# Patient Record
Sex: MALE | Race: WHITE | NOT HISPANIC OR LATINO | Employment: UNEMPLOYED | URBAN - METROPOLITAN AREA
[De-identification: names, ages, dates, MRNs, and addresses within clinical notes are randomized per-mention and may not be internally consistent; named-entity substitution may affect disease eponyms.]

---

## 2017-02-14 ENCOUNTER — OFFICE VISIT (OUTPATIENT)
Dept: INTERNAL MEDICINE | Facility: CLINIC | Age: 37
End: 2017-02-14

## 2017-02-14 VITALS
TEMPERATURE: 98.3 F | SYSTOLIC BLOOD PRESSURE: 142 MMHG | HEIGHT: 72 IN | DIASTOLIC BLOOD PRESSURE: 86 MMHG | HEART RATE: 82 BPM | WEIGHT: 306.25 LBS | OXYGEN SATURATION: 98 % | BODY MASS INDEX: 41.48 KG/M2

## 2017-02-14 DIAGNOSIS — J40 BRONCHITIS: ICD-10-CM

## 2017-02-14 DIAGNOSIS — J06.9 ACUTE URI: Primary | ICD-10-CM

## 2017-02-14 PROCEDURE — 99213 OFFICE O/P EST LOW 20 MIN: CPT | Performed by: NURSE PRACTITIONER

## 2017-02-14 RX ORDER — AZITHROMYCIN 250 MG/1
TABLET, FILM COATED ORAL
Qty: 6 TABLET | Refills: 0 | Status: SHIPPED | OUTPATIENT
Start: 2017-02-14 | End: 2022-02-24

## 2017-02-14 NOTE — PROGRESS NOTES
Subjective   Best Abdullahi is a 37 y.o. male. Patient is here today for   Chief Complaint   Patient presents with   • URI     Pt complains of having productive cough, congestion, ST, chest pain x2 days. Pt has tried taking Dayquil Otc.    .    History of Present Illness   C/O nasal congestion x 2 days associated with sore throat, chest congestion, cough. He has tried Dayquil and an antihistamine which did help some. Denies fever, shortness of breath.     The following portions of the patient's history were reviewed and updated as appropriate: allergies, current medications, past family history, past medical history, past social history, past surgical history and problem list.    Review of Systems   Constitutional: Positive for chills.   HENT: Positive for congestion, postnasal drip and sore throat. Negative for ear pain and sinus pressure.    Respiratory: Positive for cough. Negative for shortness of breath and wheezing.    Cardiovascular: Negative.        Objective   Vitals:    02/14/17 1055   BP: 142/86   Pulse: 82   Temp: 98.3 °F (36.8 °C)   SpO2: 98%     Physical Exam   Constitutional: Vital signs are normal. He appears well-developed and well-nourished.   HENT:   Right Ear: A middle ear effusion is present.   Left Ear: A middle ear effusion is present.   Mouth/Throat: Posterior oropharyngeal erythema present. No tonsillar abscesses.   Cardiovascular: Normal rate, regular rhythm and normal heart sounds.    Pulmonary/Chest: Effort normal and breath sounds normal.   Lymphadenopathy:     He has no cervical adenopathy.   Skin: Skin is warm and dry.   Nursing note and vitals reviewed.      Assessment/Plan   Best was seen today for uri.    Diagnoses and all orders for this visit:    Acute URI    Bronchitis  -     azithromycin (ZITHROMAX) 250 MG tablet; Take 2 tablets the first day, then 1 tablet daily for 4 days.      Patient will use OTC Mucinex DM,  Increase fluids, and rest.

## 2021-07-09 ENCOUNTER — TELEPHONE (OUTPATIENT)
Dept: INTERNAL MEDICINE | Facility: CLINIC | Age: 41
End: 2021-07-09

## 2021-07-09 NOTE — TELEPHONE ENCOUNTER
Caller: Best Abdullahi    Relationship: Self    Best call back number: [phone]     What medication are you requesting: TRIAMCINOLONE ACETONIEE    What are your current symptoms: RASH IN ARM PIT      Have you had these symptoms before:    [x] Yes  [] No    Have you been treated for these symptoms before:   [x] Yes  [] No    If a prescription is needed, what is your preferred pharmacy and phone number:    [facility]  [facility] [id number] [phone]    Additional notes:

## 2021-07-09 NOTE — TELEPHONE ENCOUNTER
Spoke with patients wife Di-she is aware that he has not been seen since 2017 & would be considered a new patient. The patient is actually out of town in [place]. He has been made aware to go to an immediate care to be treated for his rash.

## 2022-02-24 ENCOUNTER — OFFICE VISIT (OUTPATIENT)
Dept: INTERNAL MEDICINE | Facility: CLINIC | Age: 42
End: 2022-02-24

## 2022-02-24 VITALS
SYSTOLIC BLOOD PRESSURE: 122 MMHG | TEMPERATURE: 97 F | BODY MASS INDEX: 39.42 KG/M2 | HEIGHT: 72 IN | HEART RATE: 99 BPM | DIASTOLIC BLOOD PRESSURE: 62 MMHG | OXYGEN SATURATION: 97 % | WEIGHT: 291 LBS

## 2022-02-24 DIAGNOSIS — E66.9 OBESITY (BMI 30-39.9): ICD-10-CM

## 2022-02-24 DIAGNOSIS — R42 LIGHTHEADEDNESS: ICD-10-CM

## 2022-02-24 DIAGNOSIS — M62.830 LUMBAR PARASPINAL MUSCLE SPASM: ICD-10-CM

## 2022-02-24 DIAGNOSIS — Z00.00 HEALTH CARE MAINTENANCE: Primary | ICD-10-CM

## 2022-02-24 DIAGNOSIS — R42 DIZZINESS: ICD-10-CM

## 2022-02-24 PROCEDURE — 99386 PREV VISIT NEW AGE 40-64: CPT | Performed by: NURSE PRACTITIONER
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
## 2022-03-08 LAB
25(OH)D3+25(OH)D2 SERPL-MCNC: 10.3 NG/ML (ref 30–100)
ALBUMIN SERPL-MCNC: 4 G/DL (ref 4–5)
ALBUMIN/GLOB SERPL: 1.6 {RATIO} (ref 1.2–2.2)
ALP SERPL-CCNC: 51 IU/L (ref 44–121)
ALT SERPL-CCNC: 31 IU/L (ref 0–44)
AST SERPL-CCNC: 24 IU/L (ref 0–40)
BASOPHILS # BLD AUTO: 0.1 X10E3/UL (ref 0–0.2)
BASOPHILS NFR BLD AUTO: 1 %
BILIRUB SERPL-MCNC: 0.4 MG/DL (ref 0–1.2)
BUN SERPL-MCNC: 7 MG/DL (ref 6–24)
BUN/CREAT SERPL: 8 (ref 9–20)
CALCIUM SERPL-MCNC: 8.9 MG/DL (ref 8.7–10.2)
CHLORIDE SERPL-SCNC: 103 MMOL/L (ref 96–106)
CHOLEST SERPL-MCNC: 169 MG/DL (ref 100–199)
CO2 SERPL-SCNC: 24 MMOL/L (ref 20–29)
CREAT SERPL-MCNC: 0.89 MG/DL (ref 0.76–1.27)
EGFR GENE MUT ANL BLD/T: 110 ML/MIN/1.73
EOSINOPHIL # BLD AUTO: 0 X10E3/UL (ref 0–0.4)
EOSINOPHIL NFR BLD AUTO: 0 %
ERYTHROCYTE [DISTWIDTH] IN BLOOD BY AUTOMATED COUNT: 12.3 % (ref 11.6–15.4)
GLOBULIN SER CALC-MCNC: 2.5 G/DL (ref 1.5–4.5)
GLUCOSE SERPL-MCNC: 134 MG/DL (ref 65–99)
HBA1C MFR BLD: 6.4 % (ref 4.8–5.6)
HCT VFR BLD AUTO: 46.1 % (ref 37.5–51)
HCV AB S/CO SERPL IA: <0.1 S/CO RATIO (ref 0–0.9)
HDLC SERPL-MCNC: 32 MG/DL
HGB BLD-MCNC: 15.2 G/DL (ref 13–17.7)
IMM GRANULOCYTES # BLD AUTO: 0.1 X10E3/UL (ref 0–0.1)
IMM GRANULOCYTES NFR BLD AUTO: 1 %
LDLC SERPL CALC-MCNC: 105 MG/DL (ref 0–99)
LDLC/HDLC SERPL: 3.3 RATIO (ref 0–3.6)
LYMPHOCYTES # BLD AUTO: 2.3 X10E3/UL (ref 0.7–3.1)
LYMPHOCYTES NFR BLD AUTO: 26 %
MCH RBC QN AUTO: 28.3 PG (ref 26.6–33)
MCHC RBC AUTO-ENTMCNC: 33 G/DL (ref 31.5–35.7)
MCV RBC AUTO: 86 FL (ref 79–97)
MONOCYTES # BLD AUTO: 0.7 X10E3/UL (ref 0.1–0.9)
MONOCYTES NFR BLD AUTO: 8 %
NEUTROPHILS # BLD AUTO: 5.4 X10E3/UL (ref 1.4–7)
NEUTROPHILS NFR BLD AUTO: 64 %
PLATELET # BLD AUTO: 270 X10E3/UL (ref 150–450)
POTASSIUM SERPL-SCNC: 4.5 MMOL/L (ref 3.5–5.2)
PROT SERPL-MCNC: 6.5 G/DL (ref 6–8.5)
RBC # BLD AUTO: 5.38 X10E6/UL (ref 4.14–5.8)
SODIUM SERPL-SCNC: 140 MMOL/L (ref 134–144)
TRIGL SERPL-MCNC: 185 MG/DL (ref 0–149)
TSH SERPL DL<=0.005 MIU/L-ACNC: 3.04 UIU/ML (ref 0.45–4.5)
VLDLC SERPL CALC-MCNC: 32 MG/DL (ref 5–40)
WBC # BLD AUTO: 8.5 X10E3/UL (ref 3.4–10.8)

## 2022-03-09 ENCOUNTER — TREATMENT (OUTPATIENT)
Dept: PHYSICAL THERAPY | Facility: CLINIC | Age: 42
End: 2022-03-09

## 2022-03-09 DIAGNOSIS — M62.830 LUMBAR PARASPINAL MUSCLE SPASM: Primary | ICD-10-CM

## 2022-03-09 PROCEDURE — 97140 MANUAL THERAPY 1/> REGIONS: CPT | Performed by: PHYSICAL THERAPIST

## 2022-03-09 PROCEDURE — 97035 APP MDLTY 1+ULTRASOUND EA 15: CPT | Performed by: PHYSICAL THERAPIST

## 2022-03-09 PROCEDURE — 97161 PT EVAL LOW COMPLEX 20 MIN: CPT | Performed by: PHYSICAL THERAPIST
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
## 2022-03-09 NOTE — PATIENT INSTRUCTIONS
Pt was educated regarding relevant anatomy/physiology and plan of care.      Access Code: [email or web address]  URL: [email or web address]  Date: 03/09/2022  Prepared by: Ann Osorio    Exercises  Standing Lumbar Spine Flexion Stretch Counter - 1 x daily - 1 sets - 10 reps - 5 hold  Seated Lumbar Flexion Stretch - 1 x daily - 1 sets - 5 reps - 10 hold  Supine Hamstring Stretch with Strap - 1 x daily - 3 reps - 20 hold  Supine Lower Trunk Rotation - 1 x daily - 2 sets - 10 reps

## 2022-03-10 PROBLEM — R73.03 PREDIABETES: Status: ACTIVE | Noted: 2022-03-10

## 2022-03-14 DIAGNOSIS — E55.9 VITAMIN D DEFICIENCY: Primary | ICD-10-CM

## 2022-03-14 DIAGNOSIS — R73.03 PREDIABETES: ICD-10-CM

## 2022-03-14 RX ORDER — ERGOCALCIFEROL 1.25 MG/1
50000 CAPSULE ORAL WEEKLY
Qty: 8 CAPSULE | Refills: 0 | Status: SHIPPED | OUTPATIENT
Start: 2022-03-14 | End: 2022-03-15

## 2022-03-15 RX ORDER — ERGOCALCIFEROL 1.25 MG/1
50000 CAPSULE ORAL WEEKLY
Qty: 8 CAPSULE | Refills: 0 | Status: SHIPPED | OUTPATIENT
Start: 2022-03-15 | End: 2022-03-16

## 2022-03-16 ENCOUNTER — TREATMENT (OUTPATIENT)
Dept: PHYSICAL THERAPY | Facility: CLINIC | Age: 42
End: 2022-03-16

## 2022-03-16 DIAGNOSIS — M62.830 LUMBAR PARASPINAL MUSCLE SPASM: Primary | ICD-10-CM

## 2022-03-16 PROCEDURE — 97110 THERAPEUTIC EXERCISES: CPT | Performed by: PHYSICAL THERAPIST

## 2022-03-16 PROCEDURE — 97035 APP MDLTY 1+ULTRASOUND EA 15: CPT | Performed by: PHYSICAL THERAPIST

## 2022-03-16 PROCEDURE — 97530 THERAPEUTIC ACTIVITIES: CPT | Performed by: PHYSICAL THERAPIST

## 2022-03-16 RX ORDER — ERGOCALCIFEROL 1.25 MG/1
50000 CAPSULE ORAL WEEKLY
Qty: 8 CAPSULE | Refills: 0 | Status: SHIPPED | OUTPATIENT
Start: 2022-03-16 | End: 2022-05-05

## 2022-03-16 NOTE — PROGRESS NOTES
Physical Therapy Daily Progress Note      Patient: Best Abdullahi   : 1980  Diagnosis/ICD-10 Code:  No primary diagnosis found.  Referring practitioner: JERRI Waldron  Date of Initial Visit: Type: THERAPY  Noted: 3/9/2022  Today's Date: 3/16/2022  Patient seen for 2 sessions         Best Abdullahi reports:  feeling a little better, but still having a dull ache in L low back, which gets worse when lifting, bending over or sitting for a period of time.     Pt states noted LB  relief in regards to decreased discomfort/stiffness with LAD and after Ultrasound application.    Subjective     Objective   - TTP left lumbar paraspinals (deep > superficial); tender L PSIS and ~L2/L3 with PA glide  - demonstrated decreased stance time on L LE during ambulation      See Exercise, Manual, and Modality Logs for complete treatment.   - educated on use of lumbar/thoracic support while seated and proper workstation set up  - instructed on proper core engagement with movements and performance of updated HEP    Assessment/Plan  Pt subjectively reports his L low back is feeling better since start of PT, but still has complaints of increased pain/stiffness when lifting or sitting/standing for prolonged time. Pt had positive response to US and L LE long axis distraction in regards to noted less tenderness at L lumbar paraspinals and decreased stiffness/discomfort. Benefits from education on posture, use of lumbar/thoracic support when seated and proper core engagement with transitional movements and activities. Able to perform exercises without increased symptoms or discomfort.     Progress per Plan of Care and Progress strengthening /stabilization /functional activity as symptoms allow.           Manual Therapy:    5     mins  53704;  Therapeutic Exercise:    20     mins  88801;     Neuromuscular Kevyn:        mins  86477;    Therapeutic Activity:     10     mins  56820;     Gait Training:           mins  61117;      Ultrasound:     10    mins  08969;    Electrical Stimulation:         mins  39833 ( );  Dry Needling          mins self-pay    Timed Treatment:   45   mins   Total Treatment:     45   mins    I was present in the PT Department guiding the student by approving, concurring, and confirming the skilled judgement for all services rendered.      Olivia Pardo, Student PTA    Pepe Hall, PTA  Physical Therapist Assistant  [phone]

## 2022-03-22 ENCOUNTER — TREATMENT (OUTPATIENT)
Dept: PHYSICAL THERAPY | Facility: CLINIC | Age: 42
End: 2022-03-22

## 2022-03-22 DIAGNOSIS — M62.830 LUMBAR PARASPINAL MUSCLE SPASM: Primary | ICD-10-CM

## 2022-03-22 PROCEDURE — 97530 THERAPEUTIC ACTIVITIES: CPT | Performed by: PHYSICAL THERAPIST

## 2022-03-22 PROCEDURE — 97035 APP MDLTY 1+ULTRASOUND EA 15: CPT | Performed by: PHYSICAL THERAPIST

## 2022-03-22 PROCEDURE — 97110 THERAPEUTIC EXERCISES: CPT | Performed by: PHYSICAL THERAPIST

## 2022-03-22 NOTE — PROGRESS NOTES
"   Physical Therapy Daily Progress Note    Patient: Best Abdullahi   : 1980  Diagnosis/ICD-10 Code:  No primary diagnosis found.  Referring practitioner: JERRI Waldron  Date of Initial Visit: Type: THERAPY  Noted: 3/9/2022  Today's Date: 3/22/2022  Patient seen for 3 sessions         Best Abdullahi reports: feeling better for a few days after last session, but then \"had a busy weekend going hiking and putting out 10 bags of mulch, so my back is pretty sore today.\"  States relief with mobility exercises/stretches, B LAD and ultrasound in clinic.    Subjective     Objective     See Exercise, Manual, and Modality Logs for complete treatment.   - discussed proper core engagement and performance of HEP stretches daily, especially before activity (ie hiking, yard work)  -discussed breaking up of tasks to minimize LB discomfort    Assessment/Plan  Subjectively, pt reported his low back/L LE feeling much better after last PT session, but has increased pain/discomfort today, from hiking and yardwork over the weekend. Noted positive response to mobility exercises, long axis distraction and ultrasound with overall decreased soreness/discomfort in L Low back/LE.  Able to tolerate progression of core/ LE stability and strengthening exercises with no adverse effects. Benefits from verbal cueing for performance/technique of exercises, as well as education on use of core engagement and stretches before activity.    Progress per Plan of Care and Progress strengthening /stabilization /functional activity as pain/discomfort allows.           Manual Therapy:    5     mins  20372;  Therapeutic Exercise:    20     mins  49227;     Neuromuscular Kevyn:        mins  41051;    Therapeutic Activity:    10      mins  52758;     Gait Training:           mins  54876;     Ultrasound:     12    mins  46077;    Electrical Stimulation:         mins  84192 ( );  Dry Needling          mins self-pay    Timed Treatment:  47   "  mins   Total Treatment:    47   mins    I was present in the PT Department guiding the student by approving, concurring, and confirming the skilled judgement for all services rendered.        Olivia Pardo, Student PTA    Pepe Hall, JO  Physical Therapist Assistant [id number]

## 2022-03-28 ENCOUNTER — TREATMENT (OUTPATIENT)
Dept: PHYSICAL THERAPY | Facility: CLINIC | Age: 42
End: 2022-03-28

## 2022-03-28 DIAGNOSIS — M62.830 LUMBAR PARASPINAL MUSCLE SPASM: Primary | ICD-10-CM

## 2022-03-28 PROCEDURE — 97110 THERAPEUTIC EXERCISES: CPT | Performed by: PHYSICAL THERAPIST

## 2022-03-28 PROCEDURE — 97140 MANUAL THERAPY 1/> REGIONS: CPT | Performed by: PHYSICAL THERAPIST

## 2022-03-28 PROCEDURE — 97530 THERAPEUTIC ACTIVITIES: CPT | Performed by: PHYSICAL THERAPIST

## 2022-03-28 NOTE — PROGRESS NOTES
"   Physical Therapy Daily Progress Note    Patient: Best Abdullahi   : 1980  Diagnosis/ICD-10 Code:  No primary diagnosis found.  Referring practitioner: JERRI Waldron  Date of Initial Visit: Type: THERAPY  Noted: 3/9/2022  Today's Date: 3/28/2022  Patient seen for 4 sessions         Best Abdullahi reports: \"my back has been feeling much better this week and I even had a busy week/weekend.\" States he notices tightness/stiffness but not as much pain/discomfort. Says he has been keeping up with HEP which is helping throughout the work day and at home.      Subjective     Objective   - L lumbar paraspinals (L3-L4) tender to palpate with deep pressure    See Exercise, Manual, and Modality Logs for complete treatment.     - continued use of heat/ice, stretches and breaking up activities for relief    Assessment/Plan  Pt reports positive response to therapeutic interventions in regards to overall decreased pain/discomfort in low back with only some stiffness/tightness noted. Low back stiffness was decreased by end of today's session with manual efforts and therapeutic strengthening/mobility exercise performance. Able to tolerate side lying hip abduction, pallof press and resisted walkouts at FoxyP2 machine with no increase of low back discomfort/pain, only early mm fatigue present during hip abduction. Benefits from continued verbal cueing for proper technique with emphasis on body positioning and core engagement during performance of activities.    Progress per Plan of Care and Progress strengthening /stabilization /functional activity as tolerated. Add sit to stands/ RDL's next visit as symptoms allow.           Manual Therapy:   10      mins  95858;  Therapeutic Exercise:    24     mins  15540;     Neuromuscular Kevyn:        mins  41862;    Therapeutic Activity:     15     mins  04320;     Gait Training:           mins  88643;     Ultrasound:          mins  44210;    Electrical Stimulation:         " mins  79792 ( );  Dry Needling          mins self-pay    Timed Treatment:   49   mins   Total Treatment:    49    mins    I was present in the PT Department guiding the student by approving, concurring, and confirming the skilled judgement for all services rendered.        Olivia Pardo, Student PTA    Pepe Hall, PTA  Physical Therapist Assistant [phone]

## 2022-04-04 ENCOUNTER — TREATMENT (OUTPATIENT)
Dept: PHYSICAL THERAPY | Facility: CLINIC | Age: 42
End: 2022-04-04

## 2022-04-04 DIAGNOSIS — M62.830 LUMBAR PARASPINAL MUSCLE SPASM: Primary | ICD-10-CM

## 2022-04-04 PROCEDURE — 97530 THERAPEUTIC ACTIVITIES: CPT | Performed by: PHYSICAL THERAPIST

## 2022-04-04 PROCEDURE — 97110 THERAPEUTIC EXERCISES: CPT | Performed by: PHYSICAL THERAPIST

## 2022-04-04 PROCEDURE — 97140 MANUAL THERAPY 1/> REGIONS: CPT | Performed by: PHYSICAL THERAPIST

## 2022-04-04 NOTE — PROGRESS NOTES
Physical Therapy Daily Progress Note    Patient: Best Abdullahi   : 1980  Diagnosis/ICD-10 Code:  Lumbar paraspinal muscle spasm [M62.830]  Referring practitioner: JERRI Waldron  Date of Initial Visit: Type: THERAPY  Noted: 3/9/2022  Today's Date: 2022  Patient seen for 5 sessions         Best Abdullahi reports: doing well. Not having nearly as much pain anymore.     Subjective     Objective   See Exercise, Manual, and Modality Logs for complete treatment.       Assessment/Plan  Subjectively, pt reports no increase of pain or discomfort with interventions performed today. Performed well with continued lumbar mobility and cores stability interventions. Continues to demonstrate tenderness to L Lumbar paraspinals, relieved with manual techniques.  Continues to benefit from verbal/tactile cues to ensure proper form and technique for exercise performance.     Progress per Plan of Care           Manual Therapy:    10     mins  50629;  Therapeutic Exercise:    25     mins  73716;     Neuromuscular Kevyn:        mins  70562;    Therapeutic Activity:     10     mins  16356;     Gait Training:           mins  06628;     Ultrasound:          mins  24062;    Electrical Stimulation:         mins  75346 ( );  Dry Needling          mins self-pay    Timed Treatment:   45   mins   Total Treatment:     55   mins    Evelia Guillory PTA  Physical Therapist Assistant [phone]

## 2022-04-15 ENCOUNTER — TREATMENT (OUTPATIENT)
Dept: PHYSICAL THERAPY | Facility: CLINIC | Age: 42
End: 2022-04-15

## 2022-04-15 DIAGNOSIS — M62.830 LUMBAR PARASPINAL MUSCLE SPASM: Primary | ICD-10-CM

## 2022-04-15 PROCEDURE — 97530 THERAPEUTIC ACTIVITIES: CPT | Performed by: PHYSICAL THERAPIST

## 2022-04-15 PROCEDURE — 97110 THERAPEUTIC EXERCISES: CPT | Performed by: PHYSICAL THERAPIST

## 2022-04-15 PROCEDURE — 97140 MANUAL THERAPY 1/> REGIONS: CPT | Performed by: PHYSICAL THERAPIST

## 2022-04-15 NOTE — PROGRESS NOTES
Re-Assessment    Patient: Best Abdullahi   : 1980  Diagnosis/ICD-10 Code:  Lumbar paraspinal muscle spasm [M62.830]  Referring practitioner: JERRI Wadlron  Date of Initial Visit: 3/9/2022  Today's Date: 4/15/2022  Patient seen for 6 sessions      Subjective:   Best Abdullahi reports: back is significantly improved, feels ready to D/C from therapy.   Subjective Questionnaire: Oswestry: 20% limitation (improved from 32% limitation at initial evaluation)  Clinical Progress: improved  Home Program Compliance: Yes  Treatment has included: therapeutic exercise, neuromuscular re-education, manual therapy and ultrasound    Subjective   Objective          Strength/Myotome Testing     Left Hip   Planes of Motion   Flexion: 4+  Extension: 4+  Abduction: 4+  Adduction: 4+    Right Hip   Planes of Motion   Flexion: 4+  Extension: 4+  Abduction: 4+  Adduction: 4+      Assessment/Plan  Progress toward previous goals: Partially Met    Short Term Goals: 6 weeks. Patient will:  1. Be independent with initial HEP (met)  2. Be instructed in posture and body mechanics (met)  3. Demonstrate TrA contraction during exercises in clinic without need for cueing. (met)    Long Term Goals: 12 weeks. Patient will:  1. Demonstrate improved Bilateral lower extremity/lower abdominal MMT of >/= 4+/5 to allow for performance of daily activities without pain. (met)  2. Demonstrate lower extremity flexibility and lumbar ROM WFL to allow for return to household & recreational activities w/o increased symptoms (progressing)  3. Report ability to lift his children without limitation from pain. (met)  4. Perceived disability </= 16% as measured by Oswestry Questionnaire. (progressing)  5. Be independent with long term HEP. (met)      Recommendations: Hold chart open 30 days in case of acute exacerbation, otherwise D/C to HEP  Timeframe: 1 month  Prognosis to achieve goals: good    PT Signature: Ann Osorio, PT, DPT                          Physical Therapist                         [place] License #[id number]    Manual Therapy:    14     mins  [id number];  Therapeutic Exercise:    25     mins  80534;     Neuromuscular Kevyn:    0    mins  02989;    Therapeutic Activity:     12     mins  [id number];     Gait Trainin     mins  [id number];     Ultrasound:     0     mins  08937;    Electrical Stimulation:    0     mins  49129 ( );    Timed Treatment:   51   mins   Total Treatment:     51   mins

## 2023-02-10 DIAGNOSIS — R73.03 PREDIABETES: Primary | ICD-10-CM

## 2023-02-10 DIAGNOSIS — Z00.00 ANNUAL PHYSICAL EXAM: ICD-10-CM

## 2023-02-10 DIAGNOSIS — E55.9 VITAMIN D DEFICIENCY: ICD-10-CM

## 2023-02-10 LAB
25(OH)D3+25(OH)D2 SERPL-MCNC: 23.4 NG/ML (ref 30–100)
ALBUMIN SERPL-MCNC: 4.7 G/DL (ref 3.5–5.2)
ALBUMIN/GLOB SERPL: 2 G/DL
ALP SERPL-CCNC: 52 U/L (ref 39–117)
ALT SERPL-CCNC: 18 U/L (ref 1–41)
AST SERPL-CCNC: 16 U/L (ref 1–40)
BILIRUB SERPL-MCNC: 1.1 MG/DL (ref 0–1.2)
BUN SERPL-MCNC: 10 MG/DL (ref 6–20)
BUN/CREAT SERPL: 11.8 (ref 7–25)
CALCIUM SERPL-MCNC: 9.4 MG/DL (ref 8.6–10.5)
CHLORIDE SERPL-SCNC: 103 MMOL/L (ref 98–107)
CHOLEST SERPL-MCNC: 164 MG/DL (ref 0–200)
CO2 SERPL-SCNC: 27.5 MMOL/L (ref 22–29)
CREAT SERPL-MCNC: 0.85 MG/DL (ref 0.76–1.27)
EGFRCR SERPLBLD CKD-EPI 2021: 111.3 ML/MIN/1.73
ERYTHROCYTE [DISTWIDTH] IN BLOOD BY AUTOMATED COUNT: 12.7 % (ref 12.3–15.4)
GLOBULIN SER CALC-MCNC: 2.3 GM/DL
GLUCOSE SERPL-MCNC: 102 MG/DL (ref 65–99)
HBA1C MFR BLD: 5.5 % (ref 4.8–5.6)
HCT VFR BLD AUTO: 47.9 % (ref 37.5–51)
HDLC SERPL-MCNC: 47 MG/DL (ref 40–60)
HGB BLD-MCNC: 15.8 G/DL (ref 13–17.7)
LDLC SERPL CALC-MCNC: 103 MG/DL (ref 0–100)
LDLC/HDLC SERPL: 2.18 {RATIO}
MCH RBC QN AUTO: 28.7 PG (ref 26.6–33)
MCHC RBC AUTO-ENTMCNC: 33 G/DL (ref 31.5–35.7)
MCV RBC AUTO: 87.1 FL (ref 79–97)
PLATELET # BLD AUTO: 279 10*3/MM3 (ref 140–450)
POTASSIUM SERPL-SCNC: 5 MMOL/L (ref 3.5–5.2)
PROT SERPL-MCNC: 7 G/DL (ref 6–8.5)
RBC # BLD AUTO: 5.5 10*6/MM3 (ref 4.14–5.8)
SODIUM SERPL-SCNC: 141 MMOL/L (ref 136–145)
TRIGL SERPL-MCNC: 72 MG/DL (ref 0–150)
TSH SERPL DL<=0.005 MIU/L-ACNC: 1.51 UIU/ML (ref 0.27–4.2)
VLDLC SERPL CALC-MCNC: 14 MG/DL (ref 5–40)
WBC # BLD AUTO: 7.65 10*3/MM3 (ref 3.4–10.8)

## 2023-02-27 ENCOUNTER — OFFICE VISIT (OUTPATIENT)
Dept: INTERNAL MEDICINE | Facility: CLINIC | Age: 43
End: 2023-02-27
Payer: COMMERCIAL

## 2023-02-27 VITALS
SYSTOLIC BLOOD PRESSURE: 126 MMHG | TEMPERATURE: 98 F | HEART RATE: 84 BPM | BODY MASS INDEX: 28.71 KG/M2 | OXYGEN SATURATION: 97 % | WEIGHT: 212 LBS | DIASTOLIC BLOOD PRESSURE: 82 MMHG | HEIGHT: 72 IN

## 2023-02-27 DIAGNOSIS — M54.50 CHRONIC LEFT-SIDED LOW BACK PAIN WITHOUT SCIATICA: ICD-10-CM

## 2023-02-27 DIAGNOSIS — D23.9 DERMATOFIBROMA: ICD-10-CM

## 2023-02-27 DIAGNOSIS — G89.29 CHRONIC PAIN OF RIGHT THUMB: ICD-10-CM

## 2023-02-27 DIAGNOSIS — Z77.21 EXPOSURE TO BODY FLUID: ICD-10-CM

## 2023-02-27 DIAGNOSIS — G89.29 CHRONIC LEFT-SIDED LOW BACK PAIN WITHOUT SCIATICA: ICD-10-CM

## 2023-02-27 DIAGNOSIS — Z00.00 HEALTH CARE MAINTENANCE: Primary | ICD-10-CM

## 2023-02-27 DIAGNOSIS — N62 GYNECOMASTIA: ICD-10-CM

## 2023-02-27 DIAGNOSIS — M79.644 CHRONIC PAIN OF RIGHT THUMB: ICD-10-CM

## 2023-02-27 PROCEDURE — 99396 PREV VISIT EST AGE 40-64: CPT | Performed by: NURSE PRACTITIONER

## 2023-02-27 PROCEDURE — 99214 OFFICE O/P EST MOD 30 MIN: CPT | Performed by: NURSE PRACTITIONER
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
## 2023-03-20 ENCOUNTER — HOSPITAL ENCOUNTER (OUTPATIENT)
Dept: GENERAL RADIOLOGY | Facility: HOSPITAL | Age: 43
Discharge: HOME | End: 2023-03-20
Admitting: NURSE PRACTITIONER
Payer: COMMERCIAL

## 2023-03-20 DIAGNOSIS — M54.50 CHRONIC LEFT-SIDED LOW BACK PAIN WITHOUT SCIATICA: ICD-10-CM

## 2023-03-20 DIAGNOSIS — G89.29 CHRONIC LEFT-SIDED LOW BACK PAIN WITHOUT SCIATICA: ICD-10-CM

## 2023-03-20 PROCEDURE — 72110 X-RAY EXAM L-2 SPINE 4/>VWS: CPT

## 2023-03-23 RX ORDER — MELOXICAM 15 MG/1
15 TABLET ORAL DAILY
Qty: 30 TABLET | Refills: 3 | Status: SHIPPED | OUTPATIENT
Start: 2023-03-23

## 2023-06-21 LAB — HBV SURFACE AG SERPL QL IA: NEGATIVE

## 2023-06-22 LAB
HBV SURFACE AB SER QL: NON REACTIVE
WRITTEN AUTHORIZATION: NORMAL

## 2023-07-11 ENCOUNTER — APPOINTMENT (OUTPATIENT)
Dept: GENERAL RADIOLOGY | Facility: HOSPITAL | Age: 43
DRG: 700 | End: 2023-07-11
Payer: COMMERCIAL

## 2023-07-11 ENCOUNTER — APPOINTMENT (OUTPATIENT)
Dept: CT IMAGING | Facility: HOSPITAL | Age: 43
DRG: 700 | End: 2023-07-11
Payer: COMMERCIAL

## 2023-07-11 ENCOUNTER — HOSPITAL ENCOUNTER (INPATIENT)
Facility: HOSPITAL | Age: 43
LOS: 3 days | Discharge: HOME | DRG: 700 | End: 2023-07-14
Attending: STUDENT IN AN ORGANIZED HEALTH CARE EDUCATION/TRAINING PROGRAM
Payer: COMMERCIAL

## 2023-07-11 DIAGNOSIS — R10.12 LEFT UPPER QUADRANT PAIN: ICD-10-CM

## 2023-07-11 DIAGNOSIS — R52 INTRACTABLE PAIN: Primary | ICD-10-CM

## 2023-07-11 LAB
ALBUMIN SERPL-MCNC: 4.6 G/DL (ref 3.5–5.2)
ALBUMIN/GLOB SERPL: 1.5 G/DL
ALP SERPL-CCNC: 55 U/L (ref 39–117)
ALT SERPL W P-5'-P-CCNC: 18 U/L (ref 1–41)
AMORPH URATE CRY URNS QL MICRO: NORMAL /HPF
ANION GAP SERPL CALCULATED.3IONS-SCNC: 10 MMOL/L (ref 5–15)
AST SERPL-CCNC: 15 U/L (ref 1–40)
BACTERIA UR QL AUTO: NORMAL /HPF
BASOPHILS # BLD AUTO: 0.01 10*3/MM3 (ref 0–0.2)
BASOPHILS NFR BLD AUTO: 0.1 % (ref 0–1.5)
BILIRUB SERPL-MCNC: 1.7 MG/DL (ref 0–1.2)
BILIRUB UR QL STRIP: NEGATIVE
BUN SERPL-MCNC: 12 MG/DL (ref 6–20)
BUN/CREAT SERPL: 15.6 (ref 7–25)
CALCIUM SPEC-SCNC: 9.8 MG/DL (ref 8.6–10.5)
CHLORIDE SERPL-SCNC: 103 MMOL/L (ref 98–107)
CLARITY UR: CLEAR
CO2 SERPL-SCNC: 24 MMOL/L (ref 22–29)
COLOR UR: ABNORMAL
CREAT SERPL-MCNC: 0.77 MG/DL (ref 0.76–1.27)
DEPRECATED RDW RBC AUTO: 38.1 FL (ref 37–54)
EGFRCR SERPLBLD CKD-EPI 2021: 113.9 ML/MIN/1.73
EOSINOPHIL # BLD AUTO: 0.01 10*3/MM3 (ref 0–0.4)
EOSINOPHIL NFR BLD AUTO: 0.1 % (ref 0.3–6.2)
ERYTHROCYTE [DISTWIDTH] IN BLOOD BY AUTOMATED COUNT: 12.1 % (ref 12.3–15.4)
GEN 5 2HR TROPONIN T REFLEX: 6 NG/L
GLOBULIN UR ELPH-MCNC: 3 GM/DL
GLUCOSE SERPL-MCNC: 124 MG/DL (ref 65–99)
GLUCOSE UR STRIP-MCNC: NEGATIVE MG/DL
HCT VFR BLD AUTO: 45.3 % (ref 37.5–51)
HGB BLD-MCNC: 15.3 G/DL (ref 13–17.7)
HGB UR QL STRIP.AUTO: NEGATIVE
HYALINE CASTS UR QL AUTO: NORMAL /LPF
IMM GRANULOCYTES # BLD AUTO: 0.02 10*3/MM3 (ref 0–0.05)
IMM GRANULOCYTES NFR BLD AUTO: 0.2 % (ref 0–0.5)
KETONES UR QL STRIP: ABNORMAL
LEUKOCYTE ESTERASE UR QL STRIP.AUTO: NEGATIVE
LIPASE SERPL-CCNC: 32 U/L (ref 13–60)
LYMPHOCYTES # BLD AUTO: 0.76 10*3/MM3 (ref 0.7–3.1)
LYMPHOCYTES NFR BLD AUTO: 6.5 % (ref 19.6–45.3)
MAGNESIUM SERPL-MCNC: 1.7 MG/DL (ref 1.6–2.6)
MCH RBC QN AUTO: 28.5 PG (ref 26.6–33)
MCHC RBC AUTO-ENTMCNC: 33.8 G/DL (ref 31.5–35.7)
MCV RBC AUTO: 84.5 FL (ref 79–97)
MONOCYTES # BLD AUTO: 0.25 10*3/MM3 (ref 0.1–0.9)
MONOCYTES NFR BLD AUTO: 2.1 % (ref 5–12)
NEUTROPHILS NFR BLD AUTO: 10.7 10*3/MM3 (ref 1.7–7)
NEUTROPHILS NFR BLD AUTO: 91 % (ref 42.7–76)
NITRITE UR QL STRIP: NEGATIVE
PH UR STRIP.AUTO: 7.5 [PH] (ref 5–8)
PLATELET # BLD AUTO: 262 10*3/MM3 (ref 140–450)
PMV BLD AUTO: 10.9 FL (ref 6–12)
POTASSIUM SERPL-SCNC: 4.3 MMOL/L (ref 3.5–5.2)
PROT SERPL-MCNC: 7.6 G/DL (ref 6–8.5)
PROT UR QL STRIP: ABNORMAL
RBC # BLD AUTO: 5.36 10*6/MM3 (ref 4.14–5.8)
RBC # UR STRIP: NORMAL /HPF
REF LAB TEST METHOD: NORMAL
SODIUM SERPL-SCNC: 137 MMOL/L (ref 136–145)
SP GR UR STRIP: 1.02 (ref 1–1.03)
SQUAMOUS #/AREA URNS HPF: NORMAL /HPF
TROPONIN T DELTA: NORMAL
TROPONIN T SERPL HS-MCNC: <6 NG/L
UROBILINOGEN UR QL STRIP: ABNORMAL
WBC # UR STRIP: NORMAL /HPF
WBC NRBC COR # BLD: 11.75 10*3/MM3 (ref 3.4–10.8)

## 2023-07-11 PROCEDURE — 83735 ASSAY OF MAGNESIUM: CPT | Performed by: STUDENT IN AN ORGANIZED HEALTH CARE EDUCATION/TRAINING PROGRAM

## 2023-07-11 PROCEDURE — 99285 EMERGENCY DEPT VISIT HI MDM: CPT

## 2023-07-11 PROCEDURE — 74177 CT ABD & PELVIS W/CONTRAST: CPT

## 2023-07-11 PROCEDURE — 93005 ELECTROCARDIOGRAM TRACING: CPT | Performed by: STUDENT IN AN ORGANIZED HEALTH CARE EDUCATION/TRAINING PROGRAM

## 2023-07-11 PROCEDURE — 93010 ELECTROCARDIOGRAM REPORT: CPT | Performed by: INTERNAL MEDICINE

## 2023-07-11 PROCEDURE — 83690 ASSAY OF LIPASE: CPT | Performed by: STUDENT IN AN ORGANIZED HEALTH CARE EDUCATION/TRAINING PROGRAM

## 2023-07-11 PROCEDURE — 25010000002 ONDANSETRON PER 1 MG: Performed by: INTERNAL MEDICINE

## 2023-07-11 PROCEDURE — 25010000002 MORPHINE PER 10 MG: Performed by: STUDENT IN AN ORGANIZED HEALTH CARE EDUCATION/TRAINING PROGRAM

## 2023-07-11 PROCEDURE — 99284 EMERGENCY DEPT VISIT MOD MDM: CPT | Performed by: PHYSICIAN ASSISTANT

## 2023-07-11 PROCEDURE — 84484 ASSAY OF TROPONIN QUANT: CPT | Performed by: STUDENT IN AN ORGANIZED HEALTH CARE EDUCATION/TRAINING PROGRAM

## 2023-07-11 PROCEDURE — 71275 CT ANGIOGRAPHY CHEST: CPT

## 2023-07-11 PROCEDURE — 85025 COMPLETE CBC W/AUTO DIFF WBC: CPT | Performed by: STUDENT IN AN ORGANIZED HEALTH CARE EDUCATION/TRAINING PROGRAM

## 2023-07-11 PROCEDURE — 25510000001 IOPAMIDOL PER 1 ML: Performed by: STUDENT IN AN ORGANIZED HEALTH CARE EDUCATION/TRAINING PROGRAM

## 2023-07-11 PROCEDURE — 81001 URINALYSIS AUTO W/SCOPE: CPT | Performed by: PHYSICIAN ASSISTANT

## 2023-07-11 PROCEDURE — 36415 COLL VENOUS BLD VENIPUNCTURE: CPT

## 2023-07-11 PROCEDURE — 25010000002 ONDANSETRON PER 1 MG: Performed by: STUDENT IN AN ORGANIZED HEALTH CARE EDUCATION/TRAINING PROGRAM

## 2023-07-11 PROCEDURE — 80053 COMPREHEN METABOLIC PANEL: CPT | Performed by: STUDENT IN AN ORGANIZED HEALTH CARE EDUCATION/TRAINING PROGRAM

## 2023-07-11 PROCEDURE — 25010000002 MORPHINE PER 10 MG: Performed by: INTERNAL MEDICINE

## 2023-07-11 RX ORDER — SODIUM CHLORIDE 9 MG/ML
100 INJECTION, SOLUTION INTRAVENOUS CONTINUOUS
Status: DISCONTINUED | OUTPATIENT
Start: 2023-07-11 | End: 2023-07-14 | Stop reason: HOSPADM

## 2023-07-11 RX ORDER — ACETAMINOPHEN 160 MG/5ML
650 SOLUTION ORAL EVERY 4 HOURS PRN
Status: DISCONTINUED | OUTPATIENT
Start: 2023-07-11 | End: 2023-07-14 | Stop reason: HOSPADM

## 2023-07-11 RX ORDER — BISACODYL 5 MG/1
5 TABLET, DELAYED RELEASE ORAL DAILY PRN
Status: DISCONTINUED | OUTPATIENT
Start: 2023-07-11 | End: 2023-07-14 | Stop reason: HOSPADM

## 2023-07-11 RX ORDER — SODIUM CHLORIDE 0.9 % (FLUSH) 0.9 %
10 SYRINGE (ML) INJECTION AS NEEDED
Status: DISCONTINUED | OUTPATIENT
Start: 2023-07-11 | End: 2023-07-14 | Stop reason: HOSPADM

## 2023-07-11 RX ORDER — MORPHINE SULFATE 2 MG/ML
4 INJECTION, SOLUTION INTRAMUSCULAR; INTRAVENOUS ONCE
Status: COMPLETED | OUTPATIENT
Start: 2023-07-11 | End: 2023-07-11

## 2023-07-11 RX ORDER — AMOXICILLIN 250 MG
2 CAPSULE ORAL 2 TIMES DAILY
Status: DISCONTINUED | OUTPATIENT
Start: 2023-07-11 | End: 2023-07-14 | Stop reason: HOSPADM

## 2023-07-11 RX ORDER — HYDROCODONE BITARTRATE AND ACETAMINOPHEN 10; 325 MG/1; MG/1
1 TABLET ORAL EVERY 4 HOURS PRN
Status: DISCONTINUED | OUTPATIENT
Start: 2023-07-11 | End: 2023-07-14 | Stop reason: HOSPADM

## 2023-07-11 RX ORDER — ACETAMINOPHEN 650 MG/1
650 SUPPOSITORY RECTAL EVERY 4 HOURS PRN
Status: DISCONTINUED | OUTPATIENT
Start: 2023-07-11 | End: 2023-07-14 | Stop reason: HOSPADM

## 2023-07-11 RX ORDER — POLYETHYLENE GLYCOL 3350 17 G/17G
17 POWDER, FOR SOLUTION ORAL DAILY PRN
Status: DISCONTINUED | OUTPATIENT
Start: 2023-07-11 | End: 2023-07-14 | Stop reason: HOSPADM

## 2023-07-11 RX ORDER — CYCLOBENZAPRINE HCL 10 MG
10 TABLET ORAL ONCE
Status: COMPLETED | OUTPATIENT
Start: 2023-07-11 | End: 2023-07-11

## 2023-07-11 RX ORDER — SODIUM CHLORIDE 9 MG/ML
40 INJECTION, SOLUTION INTRAVENOUS AS NEEDED
Status: DISCONTINUED | OUTPATIENT
Start: 2023-07-11 | End: 2023-07-14 | Stop reason: HOSPADM

## 2023-07-11 RX ORDER — SODIUM CHLORIDE 0.9 % (FLUSH) 0.9 %
10 SYRINGE (ML) INJECTION EVERY 12 HOURS SCHEDULED
Status: DISCONTINUED | OUTPATIENT
Start: 2023-07-11 | End: 2023-07-14 | Stop reason: HOSPADM

## 2023-07-11 RX ORDER — ACETAMINOPHEN 325 MG/1
650 TABLET ORAL EVERY 4 HOURS PRN
Status: DISCONTINUED | OUTPATIENT
Start: 2023-07-11 | End: 2023-07-14 | Stop reason: HOSPADM

## 2023-07-11 RX ORDER — ONDANSETRON 2 MG/ML
4 INJECTION INTRAMUSCULAR; INTRAVENOUS ONCE
Status: COMPLETED | OUTPATIENT
Start: 2023-07-11 | End: 2023-07-11

## 2023-07-11 RX ORDER — BISACODYL 10 MG
10 SUPPOSITORY, RECTAL RECTAL DAILY PRN
Status: DISCONTINUED | OUTPATIENT
Start: 2023-07-11 | End: 2023-07-14 | Stop reason: HOSPADM

## 2023-07-11 RX ORDER — ONDANSETRON 2 MG/ML
4 INJECTION INTRAMUSCULAR; INTRAVENOUS EVERY 6 HOURS PRN
Status: DISCONTINUED | OUTPATIENT
Start: 2023-07-11 | End: 2023-07-14 | Stop reason: HOSPADM

## 2023-07-11 RX ORDER — MORPHINE SULFATE 2 MG/ML
2 INJECTION, SOLUTION INTRAMUSCULAR; INTRAVENOUS ONCE
Status: COMPLETED | OUTPATIENT
Start: 2023-07-11 | End: 2023-07-11

## 2023-07-11 RX ORDER — MORPHINE SULFATE 2 MG/ML
4 INJECTION, SOLUTION INTRAMUSCULAR; INTRAVENOUS EVERY 4 HOURS PRN
Status: DISCONTINUED | OUTPATIENT
Start: 2023-07-11 | End: 2023-07-13

## 2023-07-11 RX ADMIN — MORPHINE SULFATE 4 MG: 2 INJECTION, SOLUTION INTRAMUSCULAR; INTRAVENOUS at 21:00

## 2023-07-11 RX ADMIN — CYCLOBENZAPRINE 10 MG: 10 TABLET, FILM COATED ORAL at 22:53

## 2023-07-11 RX ADMIN — MORPHINE SULFATE 4 MG: 2 INJECTION, SOLUTION INTRAMUSCULAR; INTRAVENOUS at 17:15

## 2023-07-11 RX ADMIN — MORPHINE SULFATE 2 MG: 2 INJECTION, SOLUTION INTRAMUSCULAR; INTRAVENOUS at 18:34

## 2023-07-11 RX ADMIN — SODIUM CHLORIDE 100 ML/HR: 9 INJECTION, SOLUTION INTRAVENOUS at 22:53

## 2023-07-11 RX ADMIN — IOPAMIDOL 100 ML: 755 INJECTION, SOLUTION INTRAVENOUS at 17:57

## 2023-07-11 RX ADMIN — ONDANSETRON 4 MG: 2 INJECTION INTRAMUSCULAR; INTRAVENOUS at 17:13

## 2023-07-11 RX ADMIN — Medication 10 ML: at 22:54

## 2023-07-11 RX ADMIN — SODIUM CHLORIDE 1000 ML: 9 INJECTION, SOLUTION INTRAVENOUS at 17:11

## 2023-07-11 RX ADMIN — ONDANSETRON 4 MG: 2 INJECTION INTRAMUSCULAR; INTRAVENOUS at 22:53

## 2023-07-11 RX ADMIN — MORPHINE SULFATE 4 MG: 2 INJECTION, SOLUTION INTRAMUSCULAR; INTRAVENOUS at 22:53

## 2023-07-11 NOTE — FSED PROVIDER NOTE
Subjective   History of Present Illness    Waking up this morning with left-sided chest pain, left flank pain that radiates to his left upper quadrant that started at 6 AM.  Associated symptoms include nausea, vomiting, diarrhea.  He reports he has vomited approximately 4 times today and has had 3 episodes of watery diarrhea.  Patient does have a history of kidney stones but reports that the symptoms feel very different.    In addition, patient drove back from [place] 2 weeks ago and it was approximately 12-hour drive.  Chest pain is worse with deep breaths.    Review of Systems   Constitutional:  Negative for activity change and appetite change.   Eyes:  Negative for pain.   Respiratory:  Positive for shortness of breath.    Cardiovascular:  Positive for chest pain.   Gastrointestinal:  Positive for diarrhea, nausea and vomiting.   Musculoskeletal:  Positive for back pain. Negative for arthralgias.   Skin:  Negative for color change.   Neurological:  Negative for dizziness.   All other systems reviewed and are negative.    Past Medical History:   Diagnosis Date    Hx of renal calculi     Kidney stone     Obesity        No Known Allergies    Past Surgical History:   Procedure Laterality Date    TONSILLECTOMY         Family History   Problem Relation Age of Onset    Hypertension Mother     Gout Father     No Known Problems Maternal Grandmother     Stroke Maternal Grandfather         90    No Known Problems Paternal Grandmother     Emphysema Paternal Grandfather          in his 60s of emphysema, smoker    No Known Problems Daughter     No Known Problems Son     Cerebral palsy Half-Brother        Social History     Socioeconomic History    Marital status:      Spouse name: jorge    Number of children: 2   Tobacco Use    Smoking status: Former     Packs/day: 1.00     Years: 10.00     Pack years: 10.00     Types: Cigarettes     Quit date: 2013     Years since quitting: 10.3    Smokeless tobacco:  Never    Tobacco comments:     occ vape when out with friends.    Vaping Use    Vaping Use: Some days   Substance and Sexual Activity    Alcohol use: Yes     Comment: maybe 2-3 drinks a week     Drug use: Yes     Types: Marijuana     Comment: occ marijuna smoking 4-5 times a week    Sexual activity: Yes     Partners: Female     Birth control/protection: Surgical           Objective   Physical Exam  Vitals and nursing note reviewed.   Constitutional:       Appearance: Normal appearance. He is normal weight.      Comments: Diaphoretic and appears uncomfortable   HENT:      Head: Normocephalic and atraumatic.      Nose: Nose normal.      Mouth/Throat:      Mouth: Mucous membranes are moist.   Eyes:      Pupils: Pupils are equal, round, and reactive to light.   Cardiovascular:      Rate and Rhythm: Normal rate and regular rhythm. Bradycardia present.      Pulses: Normal pulses.      Heart sounds: Normal heart sounds.   Pulmonary:      Effort: Pulmonary effort is normal.      Breath sounds: Normal breath sounds.   Musculoskeletal:         General: Normal range of motion.      Cervical back: Normal range of motion.      Right lower leg: No edema.      Left lower leg: No edema.      Comments: Tenderness to palpation to the left mid back    No calf pain, no lower extremity swelling   Skin:     General: Skin is warm.   Neurological:      General: No focal deficit present.      Mental Status: He is alert.   Psychiatric:         Behavior: Behavior is cooperative.       Procedures           ED Course  ED Course as of 07/12/23 1740   Tue Jul 11, 2023   1651 WBC(!): 11.75 [SS]   1713 Total Bilirubin(!): 1.7 [SS]   1928 Recheck patient and informed him of the unremarkable results.  He is still complaining of severe left upper quadrant abdominal pain despite 2 rounds of morphine medication. [SS]   1936 I informed patient regarding the 4 mm nodule to his left lung.  He reports that he will follow-up with primary care regarding the  lung nodule. [SS]   2028 Patient complaining of continued pain while awaiting transport. Additional pain medications given [JS]      ED Course User Index  [JS] Luciano Santana MD  [SS] Polly Pitts PA-C                                           Medical Decision Making  Problems Addressed:  Intractable pain: complicated acute illness or injury  Left upper quadrant pain: complicated acute illness or injury    Amount and/or Complexity of Data Reviewed  Labs: ordered. Decision-making details documented in ED Course.  Radiology: ordered.  ECG/medicine tests: ordered.    Risk  Prescription drug management.  Decision regarding hospitalization.        Final diagnoses:   Intractable pain   Left upper quadrant pain       ED Disposition  ED Disposition       ED Disposition   Decision to Admit    Condition   --    Comment   Level of Care: Med/Surg [1]   Diagnosis: Intractable pain [075587]   Admitting Physician: JAMILA GUTIERREZ [[id number]]   Attending Physician: JAMILA GUTIERREZ [[id number]]   Certification: I Certify That Inpatient Hospital Services Are Medically Necessary For Greater Than 2 Midnights                 No follow-up provider specified.       Medication List      No changes were made to your prescriptions during this visit.

## 2023-07-12 ENCOUNTER — APPOINTMENT (OUTPATIENT)
Dept: CT IMAGING | Facility: HOSPITAL | Age: 43
DRG: 700 | End: 2023-07-12
Payer: COMMERCIAL

## 2023-07-12 LAB
ALBUMIN SERPL-MCNC: 4.2 G/DL (ref 3.5–5.2)
ALBUMIN/GLOB SERPL: 1.9 G/DL
ALP SERPL-CCNC: 46 U/L (ref 39–117)
ALT SERPL W P-5'-P-CCNC: 13 U/L (ref 1–41)
ANION GAP SERPL CALCULATED.3IONS-SCNC: 8 MMOL/L (ref 5–15)
AST SERPL-CCNC: 14 U/L (ref 1–40)
BASOPHILS # BLD AUTO: 0.04 10*3/MM3 (ref 0–0.2)
BASOPHILS NFR BLD AUTO: 0.3 % (ref 0–1.5)
BILIRUB SERPL-MCNC: 1.7 MG/DL (ref 0–1.2)
BUN SERPL-MCNC: 12 MG/DL (ref 6–20)
BUN/CREAT SERPL: 14.6 (ref 7–25)
CALCIUM SPEC-SCNC: 9.5 MG/DL (ref 8.6–10.5)
CHLORIDE SERPL-SCNC: 107 MMOL/L (ref 98–107)
CO2 SERPL-SCNC: 25 MMOL/L (ref 22–29)
CREAT SERPL-MCNC: 0.82 MG/DL (ref 0.76–1.27)
D-LACTATE SERPL-SCNC: 1 MMOL/L (ref 0.5–2)
DEPRECATED RDW RBC AUTO: 40.3 FL (ref 37–54)
EGFRCR SERPLBLD CKD-EPI 2021: 111.8 ML/MIN/1.73
EOSINOPHIL # BLD AUTO: 0.01 10*3/MM3 (ref 0–0.4)
EOSINOPHIL NFR BLD AUTO: 0.1 % (ref 0.3–6.2)
ERYTHROCYTE [DISTWIDTH] IN BLOOD BY AUTOMATED COUNT: 12.9 % (ref 12.3–15.4)
GLOBULIN UR ELPH-MCNC: 2.2 GM/DL
GLUCOSE SERPL-MCNC: 87 MG/DL (ref 65–99)
HCT VFR BLD AUTO: 41.9 % (ref 37.5–51)
HGB BLD-MCNC: 13.9 G/DL (ref 13–17.7)
IMM GRANULOCYTES # BLD AUTO: 0.09 10*3/MM3 (ref 0–0.05)
IMM GRANULOCYTES NFR BLD AUTO: 0.6 % (ref 0–0.5)
LYMPHOCYTES # BLD AUTO: 3.01 10*3/MM3 (ref 0.7–3.1)
LYMPHOCYTES NFR BLD AUTO: 21.3 % (ref 19.6–45.3)
MCH RBC QN AUTO: 28.8 PG (ref 26.6–33)
MCHC RBC AUTO-ENTMCNC: 33.2 G/DL (ref 31.5–35.7)
MCV RBC AUTO: 86.7 FL (ref 79–97)
MONOCYTES # BLD AUTO: 1.02 10*3/MM3 (ref 0.1–0.9)
MONOCYTES NFR BLD AUTO: 7.2 % (ref 5–12)
NEUTROPHILS NFR BLD AUTO: 70.5 % (ref 42.7–76)
NEUTROPHILS NFR BLD AUTO: 9.94 10*3/MM3 (ref 1.7–7)
NRBC BLD AUTO-RTO: 0 /100 WBC (ref 0–0.2)
PLATELET # BLD AUTO: 260 10*3/MM3 (ref 140–450)
PMV BLD AUTO: 11 FL (ref 6–12)
POTASSIUM SERPL-SCNC: 4.4 MMOL/L (ref 3.5–5.2)
PROCALCITONIN SERPL-MCNC: 0.03 NG/ML (ref 0–0.25)
PROT SERPL-MCNC: 6.4 G/DL (ref 6–8.5)
QT INTERVAL: 487 MS
RBC # BLD AUTO: 4.83 10*6/MM3 (ref 4.14–5.8)
SODIUM SERPL-SCNC: 140 MMOL/L (ref 136–145)
WBC NRBC COR # BLD: 14.11 10*3/MM3 (ref 3.4–10.8)

## 2023-07-12 PROCEDURE — 80053 COMPREHEN METABOLIC PANEL: CPT | Performed by: INTERNAL MEDICINE

## 2023-07-12 PROCEDURE — 74176 CT ABD & PELVIS W/O CONTRAST: CPT

## 2023-07-12 PROCEDURE — 85025 COMPLETE CBC W/AUTO DIFF WBC: CPT | Performed by: INTERNAL MEDICINE

## 2023-07-12 PROCEDURE — 87507 IADNA-DNA/RNA PROBE TQ 12-25: CPT | Performed by: STUDENT IN AN ORGANIZED HEALTH CARE EDUCATION/TRAINING PROGRAM

## 2023-07-12 PROCEDURE — 87040 BLOOD CULTURE FOR BACTERIA: CPT | Performed by: STUDENT IN AN ORGANIZED HEALTH CARE EDUCATION/TRAINING PROGRAM

## 2023-07-12 PROCEDURE — 25010000002 MORPHINE PER 10 MG: Performed by: INTERNAL MEDICINE

## 2023-07-12 PROCEDURE — 25010000002 ONDANSETRON PER 1 MG: Performed by: INTERNAL MEDICINE

## 2023-07-12 PROCEDURE — 84145 PROCALCITONIN (PCT): CPT | Performed by: INTERNAL MEDICINE

## 2023-07-12 PROCEDURE — 83605 ASSAY OF LACTIC ACID: CPT | Performed by: INTERNAL MEDICINE

## 2023-07-12 RX ADMIN — HYDROCODONE BITARTRATE AND ACETAMINOPHEN 1 TABLET: 10; 325 TABLET ORAL at 19:26

## 2023-07-12 RX ADMIN — HYDROCODONE BITARTRATE AND ACETAMINOPHEN 1 TABLET: 10; 325 TABLET ORAL at 22:58

## 2023-07-12 RX ADMIN — MORPHINE SULFATE 4 MG: 2 INJECTION, SOLUTION INTRAMUSCULAR; INTRAVENOUS at 11:48

## 2023-07-12 RX ADMIN — MORPHINE SULFATE 4 MG: 2 INJECTION, SOLUTION INTRAMUSCULAR; INTRAVENOUS at 20:18

## 2023-07-12 RX ADMIN — MORPHINE SULFATE 4 MG: 2 INJECTION, SOLUTION INTRAMUSCULAR; INTRAVENOUS at 07:37

## 2023-07-12 RX ADMIN — HYDROCODONE BITARTRATE AND ACETAMINOPHEN 1 TABLET: 10; 325 TABLET ORAL at 09:59

## 2023-07-12 RX ADMIN — MORPHINE SULFATE 4 MG: 2 INJECTION, SOLUTION INTRAMUSCULAR; INTRAVENOUS at 16:24

## 2023-07-12 RX ADMIN — ONDANSETRON 4 MG: 2 INJECTION INTRAMUSCULAR; INTRAVENOUS at 07:32

## 2023-07-12 RX ADMIN — HYDROCODONE BITARTRATE AND ACETAMINOPHEN 1 TABLET: 10; 325 TABLET ORAL at 14:32

## 2023-07-12 RX ADMIN — Medication 10 ML: at 10:00

## 2023-07-12 RX ADMIN — SENNOSIDES AND DOCUSATE SODIUM 2 TABLET: 50; 8.6 TABLET ORAL at 20:38

## 2023-07-12 RX ADMIN — SENNOSIDES AND DOCUSATE SODIUM 2 TABLET: 50; 8.6 TABLET ORAL at 09:59

## 2023-07-12 RX ADMIN — MORPHINE SULFATE 4 MG: 2 INJECTION, SOLUTION INTRAMUSCULAR; INTRAVENOUS at 02:25

## 2023-07-12 NOTE — ED NOTES
Nursing report ED to floor  Best Abdullahi  43 y.o.  male    HPI :   Chief Complaint   Patient presents with    Abdominal Pain       Admitting doctor:   Isai Cesar MD    Admitting diagnosis:   The primary encounter diagnosis was Intractable pain. A diagnosis of Left upper quadrant pain was also pertinent to this visit.    Code status:   Current Code Status       Date Active Code Status Order ID Comments User Context       Not on file            Allergies:   Patient has no known allergies.    Isolation:   No active isolations    Intake and Output  No intake or output data in the 24 hours ending 07/11/23 2110    Weight:       07/11/23  1620   Weight: 86.6 kg (191 lb)       Most recent vitals:   Vitals:    07/11/23 1801 07/11/23 1830 07/11/23 1921 07/11/23 1944   BP: 105/53 99/67     BP Location: Left arm      Patient Position: Lying      Pulse: 57 67 54 62   Resp: 16      Temp: 98.4 °F (36.9 °C)      TempSrc: Oral      SpO2: 100% 100% 96% 99%   Weight:       Height:           Active LDAs/IV Access:   Lines, Drains & Airways       Active LDAs       Name Placement date Placement time Site Days    Peripheral IV 07/11/23 1624 Right Antecubital 07/11/23 1624  Antecubital  less than 1                    Labs (abnormal labs have a star):   Labs Reviewed   COMPREHENSIVE METABOLIC PANEL - Abnormal; Notable for the following components:       Result Value    Glucose 124 (*)     Total Bilirubin 1.7 (*)     All other components within normal limits    Narrative:     GFR Normal >60  Chronic Kidney Disease <60  Kidney Failure <15     CBC WITH AUTO DIFFERENTIAL - Abnormal; Notable for the following components:    WBC 11.75 (*)     RDW 12.1 (*)     Neutrophil % 91.0 (*)     Lymphocyte % 6.5 (*)     Monocyte % 2.1 (*)     Eosinophil % 0.1 (*)     Neutrophils, Absolute 10.70 (*)     All other components within normal limits   URINALYSIS W/ CULTURE IF INDICATED - Abnormal; Notable for the following components:    Color, UA Dark  Yellow (*)     Ketones, UA >=160 mg/dL (4+) (*)     Protein, UA 30 mg/dL (1+) (*)     All other components within normal limits    Narrative:     In absence of clinical symptoms, the presence of pyuria, bacteria, and/or nitrites on the urinalysis result does not correlate with infection.   TROPONIN - Normal    Narrative:     High Sensitive Troponin T Reference Range:  <10.0 ng/L- Negative Female for AMI  <15.0 ng/L- Negative Male for AMI  >=10 - Abnormal Female indicating possible myocardial injury.  >=15 - Abnormal Male indicating possible myocardial injury.   Clinicians would have to utilize clinical acumen, EKG, Troponin, and serial changes to determine if it is an Acute Myocardial Infarction or myocardial injury due to an underlying chronic condition.        LIPASE - Normal   MAGNESIUM - Normal   HIGH SENSITIVITIY TROPONIN T 2HR    Narrative:     High Sensitive Troponin T Reference Range:  <10.0 ng/L- Negative Female for AMI  <15.0 ng/L- Negative Male for AMI  >=10 - Abnormal Female indicating possible myocardial injury.  >=15 - Abnormal Male indicating possible myocardial injury.   Clinicians would have to utilize clinical acumen, EKG, Troponin, and serial changes to determine if it is an Acute Myocardial Infarction or myocardial injury due to an underlying chronic condition.        URINALYSIS, MICROSCOPIC ONLY   CBC AND DIFFERENTIAL    Narrative:     The following orders were created for panel order CBC & Differential.  Procedure                               Abnormality         Status                     ---------                               -----------         ------                     CBC Auto Differential[[id number]]        Abnormal            Final result                 Please view results for these tests on the individual orders.       EKG:   ECG 12 Lead Chest Pain   Preliminary Result   HEART RATE= 55  bpm   RR Interval= 1091  ms   LA Interval= 188  ms   P Horizontal Axis= 35  deg   P Front Axis= 68   deg   QRSD Interval= 101  ms   QT Interval= 487  ms   QRS Axis= 83  deg   T Wave Axis= 69  deg   - NORMAL ECG -   Sinus rhythm   Electronically Signed By:    Date and Time of Study: 2023-07-11 16:21:10          Meds given in ED:   Medications   sodium chloride 0.9 % flush 10 mL (has no administration in time range)   sodium chloride 0.9 % bolus 1,000 mL (0 mL Intravenous Stopped 7/11/23 2052)   morphine injection 4 mg (4 mg Intravenous Given 7/11/23 1715)   ondansetron (ZOFRAN) injection 4 mg (4 mg Intravenous Given 7/11/23 1713)   iopamidol (ISOVUE-370) 76 % injection 100 mL (100 mL Intravenous Given 7/11/23 1757)   morphine injection 2 mg (2 mg Intravenous Given 7/11/23 1834)   morphine injection 4 mg (4 mg Intravenous Given 7/11/23 2100)       Imaging results:  No radiology results for the last day    Ambulatory status:   - ambulatory    Social issues:   Social History     Socioeconomic History    Marital status:      Spouse name: jorge    Number of children: 2   Tobacco Use    Smoking status: Former     Packs/day: 1.00     Years: 10.00     Pack years: 10.00     Types: Cigarettes     Quit date: 2/24/2013     Years since quitting: 10.3    Smokeless tobacco: Never    Tobacco comments:     occ vape when out with friends.    Vaping Use    Vaping Use: Never used   Substance and Sexual Activity    Alcohol use: Yes     Comment: maybe 2-3 drinks a week     Drug use: No     Comment: occ marijuna smoking 4-5 times a week    Sexual activity: Yes     Partners: Female     Birth control/protection: Surgical       NIH Stroke Scale:       Brien Medellin RN  07/11/23 21:10 EDT

## 2023-07-12 NOTE — H&P
Internal medicine history and physical  INTERNAL MEDICINE   [facility]       Patient Identification:  Name: Best Abdullahi  Age: 43 y.o.  Sex: male  :  1980  MRN: [id number]                   Primary Care Physician: Katt Lopez APRN                               Date of admission:2023    Chief Complaint: Sudden onset of vomiting body aches indigestion and diarrhea since this morning.    History of Present Illness:   Patient is a 43-year-old male who is otherwise healthy and history of kidney stones and obesity who does not take much medication except for meloxicam for arthritis was in his usual state of his health until last night when he went to bed.  Patient works out and did some clotting work yesterday.  Nobody else is sick around him.  In this background patient started having nausea vomiting body aches indigestion and diarrhea of fairly sudden onset starting at 6:00 this morning.  Because of the recent 12-hour drive couple weeks ago while coming from [place] and now complaining of chest pain involving the left side associated with left flank pain and abdominal discomfort patient had CT angiogram of the chest to rule out pulmonary realism as well as CT of the abdomen and pelvis to explain his sudden onset of abdominal pain and left-sided pain.  Patient was not found to have any acute intrathoracic abnormality or pulmonary embolism or aortic dissection but he was noted to have pulmonary nodule that would require follow-up.      Past Medical History:  Past Medical History:   Diagnosis Date    Hx of renal calculi     Kidney stone     Obesity      Past Surgical History:  Past Surgical History:   Procedure Laterality Date    TONSILLECTOMY        Home Meds:  (Not in a hospital admission)    Current Meds:     Current Facility-Administered Medications:     sodium chloride 0.9 % flush 10 mL, 10 mL, Intravenous, PRN, HoSuzanna MD    Current Outpatient Medications:      "meloxicam (MOBIC) 15 MG tablet, Take 1 tablet by mouth Daily. (Patient not taking: Reported on 2023), Disp: 30 tablet, Rfl: 3  Allergies:  No Known Allergies  Social History:   Social History     Tobacco Use    Smoking status: Former     Packs/day: 1.00     Years: 10.00     Pack years: 10.00     Types: Cigarettes     Quit date: 2013     Years since quitting: 10.3    Smokeless tobacco: Never    Tobacco comments:     occ vape when out with friends.    Substance Use Topics    Alcohol use: Yes     Comment: maybe 2-3 drinks a week       Family History:  Family History   Problem Relation Age of Onset    Hypertension Mother     Gout Father     No Known Problems Maternal Grandmother     Stroke Maternal Grandfather         90    No Known Problems Paternal Grandmother     Emphysema Paternal Grandfather          in his 60s of emphysema, smoker    No Known Problems Daughter     No Known Problems Son     Cerebral palsy Half-Brother           Review of Systems  See history of present illness and past medical history.  As described in history presenting illness.      Vitals:   /54 (BP Location: Left arm, Patient Position: Lying)   Pulse 59   Temp 98.6 °F (37 °C) (Oral)   Resp 16   Ht 182.9 cm (72\")   Wt 86.6 kg (191 lb)   SpO2 96%   BMI 25.90 kg/m²   I/O: No intake or output data in the 24 hours ending 23  Exam:  Patient is examined using the personal protective equipment as per guidelines from infection control for this particular patient as enacted.  Hand washing was performed before and after patient interaction.  General Appearance:  Alert cooperative Up and About male.   Head:    Normocephalic, without obvious abnormality, atraumatic   Eyes:    PERRL, conjunctiva/corneas clear, EOM's intact, both eyes   Ears:    Normal external ear canals, both ears   Nose:   Nares normal, septum midline, mucosa normal, no drainage    or sinus tenderness   Throat:   Lips, tongue, gums normal; oral mucosa " pink and moist   Neck:   Supple, symmetrical, trachea midline, no adenopathy;     thyroid:  no enlargement/tenderness/nodules; no carotid    bruit or JVD   Back:     Symmetric, no curvature, ROM normal, no CVA tenderness   Lungs:     Clear to auscultation bilaterally, respirations unlabored   Chest Wall:    No tenderness or deformity    Heart:  S1-S2 regular   Abdomen:   Soft no guarding rigidity or rebound noted does have tenderness in the left side of his abdomen.  Bowel sounds are positive.   Extremities:   Extremities normal, atraumatic, no cyanosis or edema   Pulses:   Pulses palpable in all extremities; symmetric all extremities   Skin:   Skin color normal, Skin is warm and dry,  no rashes or palpable lesions   Neurologic: Grossly nonfocal     Data Review:      I reviewed the patient's new clinical results.  Results from last 7 days   Lab Units 07/11/23  1625   WBC 10*3/mm3 11.75*   HEMOGLOBIN g/dL 15.3   PLATELETS 10*3/mm3 262     Results from last 7 days   Lab Units 07/11/23  1625   SODIUM mmol/L 137   POTASSIUM mmol/L 4.3   CHLORIDE mmol/L 103   CO2 mmol/L 24.0   BUN mg/dL 12   CREATININE mg/dL 0.77   CALCIUM mg/dL 9.8   GLUCOSE mg/dL 124*     ECG 12 Lead Chest Pain   Preliminary Result   HEART RATE= 55  bpm   RR Interval= 1091  ms   AK Interval= 188  ms   P Horizontal Axis= 35  deg   P Front Axis= 68  deg   QRSD Interval= 101  ms   QT Interval= 487  ms   QRS Axis= 83  deg   T Wave Axis= 69  deg   - NORMAL ECG -   Sinus rhythm   Electronically Signed By:    Date and Time of Study: 2023-07-11 16:21:10        No radiology results for the last 30 days.    Assessment:  Active Hospital Problems    Diagnosis  POA    **Intractable pain [R52]  Yes    Prediabetes [R73.03]  Yes    Hyperglycemia [R73.9]  Yes       Medical decision making/care plan: See admitting orders  Chest discomfort/pain-multifactorial.  Plan is to admit the patient check serial high sensitive troponin, provide him with symptomatic relief and  consider cardiology evaluation  Transient abdominal pain and flank pain-etiology unclear plan is to provide with symptomatic relief will continue work-up to identify the cause of this discomfort.  Renal stone-continue to monitor and provide symptomatic relief.  Isai Cesar MD   7/11/2023  21:51 EDT    Parts of this note may be an electronic transcription/translation of spoken language to printed text using the Dragon dictation system.

## 2023-07-12 NOTE — PROGRESS NOTES
Name: Best Abdullahi ADMIT: 2023   : 1980  PCP: Katt Lopez APRN    MRN: [id number] LOS: 1 days   AGE/SEX: 43 y.o. male  ROOM: [facility]     Subjective   Subjective   Patient seen and examined this morning.  Hospital day 1.  At time my examination, patient is awake, alert, resting in bed.  He continues to endorse lower abdominal quadrant pain, left-sided predominant in nature, along with left lower back tenderness to palpation.  He states that he does have history of nephrolithiasis in the past, and he states that symptoms feel similar to what he has experienced with them in the past.       Objective   Objective   Vital Signs  Temp:  [97.4 °F (36.3 °C)-98.6 °F (37 °C)] 97.8 °F (36.6 °C)  Heart Rate:  [43-81] 65  Resp:  [16-26] 17  BP: ()/(42-73) 167/52  SpO2:  [96 %-100 %] 100 %  on   ;   Device (Oxygen Therapy): room air  Body mass index is 25.9 kg/m².  Const: Normal appearance, appears slightly uncomfortable  HEENT: Normal conjunctiva, no scleral icterus  CV: Regular rate, regular rhythm  RESP: FIO2 21, clear to auscultation B/L, normal effort  GI: soft, nondistended, mild tenderness to palpation of left abdominal quadrant and left lateral lower back  MSK: No gross deformities appreciated  Skin: Warm, dry. No rashes  Neuro: Awake, alert, oriented x3, no appreciable focal neurological deficits.  Psych: Appropriate mood and affect.    Results Review     I reviewed the patient's new clinical results.  Results from last 7 days   Lab Units 23  0453 23  1625   WBC 10*3/mm3 14.11* 11.75*   HEMOGLOBIN g/dL 13.9 15.3   PLATELETS 10*3/mm3 260 262     Results from last 7 days   Lab Units 23  0453 23  1625   SODIUM mmol/L 140 137   POTASSIUM mmol/L 4.4 4.3   CHLORIDE mmol/L 107 103   CO2 mmol/L 25.0 24.0   BUN mg/dL 12 12   CREATININE mg/dL 0.82 0.77   GLUCOSE mg/dL 87 124*   EGFR mL/min/1.73 111.8 113.9     Results from last 7 days   Lab Units 23  0453 23  9737    ALBUMIN g/dL 4.2 4.6   BILIRUBIN mg/dL 1.7* 1.7*   ALK PHOS U/L 46 55   AST (SGOT) U/L 14 15   ALT (SGPT) U/L 13 18     Results from last 7 days   Lab Units 07/12/23  0453 07/11/23  1625   CALCIUM mg/dL 9.5 9.8   ALBUMIN g/dL 4.2 4.6   MAGNESIUM mg/dL  --  1.7     Results from last 7 days   Lab Units 07/12/23  0453   PROCALCITONIN ng/mL 0.03   LACTATE mmol/L 1.0     No results found for: HGBA1C, POCGLU    No radiology results for the last day  I have personally reviewed all medications:  Scheduled Medications  senna-docusate sodium, 2 tablet, Oral, BID  sodium chloride, 10 mL, Intravenous, Q12H    Infusions  sodium chloride, 100 mL/hr, Last Rate: 100 mL/hr (07/11/23 2253)    Diet  NPO Diet NPO Type: Sips with Meds, Ice Chips    I have personally reviewed:  [x]  Laboratory   [x]  Microbiology   [x]  Radiology   [x]  EKG/Telemetry  [x]  Cardiology/Vascular   []  Pathology    []  Records       Assessment/Plan     Active Hospital Problems    Diagnosis  POA    **Intractable pain [R52]  Yes    Prediabetes [R73.03]  Yes    Hyperglycemia [R73.9]  Yes      Resolved Hospital Problems   No resolved problems to display.       43 y.o. male admitted with Intractable pain.    Patient with ongoing pain involving predominantly in the left lower abdominal quadrant and left lower back.  Also, did endorse symptoms of epigastric/lower left chest wall pain on admission, however, was reproducible with palpation, and has since improved.  Work-up thus far has been unremarkable for evidence of underlying contributing etiology.  Initially, CT abdomen pelvis with contrast was obtained, did not show any evidence of acute intra-abdominal pathology, but did show findings of a right renal cortical cyst and 4 mm noncalcified pulmonary nodule along the left major fissure.  CT angiogram of the chest was obtained, was negative for evidence of pulmonary embolism.  Urinalysis was obtained, did not show any evidence of findings consistent with urinary  tract infection.  Lipase within normal limits, troponin negative.  Because patient endorsed feeling of similar symptoms in the past when he was found to have kidney stones, a CT abdomen pelvis without contrast was ordered for further evaluation today, however, did not show any evidence of nephrolithiasis or obstructive uropathy.   His WBC count remains elevated on most recent labs, however, patient is afebrile, there are no other findings to suggest acute infection at present.  However, will order GI PCR for further evaluation at this time.  We will continue to monitor for now, will continue IVF as prescribed and follow-up remaining infectious work-up.  Further management TBD based upon patient's clinical course.  Order repeat BMP in the morning for assessment of renal function and electrolytes and repeat CBC for close monitoring of patient's WBC count.      SCDs for DVT prophylaxis.  Full code.  Discussed with patient and nursing staff.  Anticipate discharge home in 1-2 days..      Sampson Tolbert DO  [facility]  07/12/23

## 2023-07-12 NOTE — PLAN OF CARE
Goal Outcome Evaluation:  Plan of Care Reviewed With: patient        Progress: improving  Outcome Evaluation: patient admitted from [facility] ED, arrived by private vehicle, pain in left upper quadrant of abdomen prior to admission but complaining of pain in left lower quadrant on admission, IVF's started, IV medication given for pain and nausea, ambulating independently, no comorbidities to educate on

## 2023-07-12 NOTE — PLAN OF CARE
Goal Outcome Evaluation:      Patient has complaints of pain. Morphine given and Norco given as well. Patient a/ox4, up ad alea. CT of pelvis done this afternoon, no abnormality noted.       Problem: Adult Inpatient Plan of Care  Goal: Absence of Hospital-Acquired Illness or Injury  Intervention: Identify and Manage Fall Risk  Recent Flowsheet Documentation  Taken 7/12/2023 1624 by Cailin Molina RN  Safety Promotion/Fall Prevention:   assistive device/personal items within reach   safety round/check completed  Taken 7/12/2023 1432 by Cailin Molina RN  Safety Promotion/Fall Prevention:   assistive device/personal items within reach   safety round/check completed  Taken 7/12/2023 1218 by Cailin Molina RN  Safety Promotion/Fall Prevention:   assistive device/personal items within reach   safety round/check completed  Taken 7/12/2023 1003 by Cailin Molina RN  Safety Promotion/Fall Prevention:   assistive device/personal items within reach   safety round/check completed  Taken 7/12/2023 0807 by Cailin Molina RN  Safety Promotion/Fall Prevention:   assistive device/personal items within reach   safety round/check completed  Intervention: Prevent Skin Injury  Recent Flowsheet Documentation  Taken 7/12/2023 1624 by Cailin Molina RN  Body Position: position changed independently  Taken 7/12/2023 1432 by Cailin Molina RN  Body Position: position changed independently  Taken 7/12/2023 1218 by Cailin Molina RN  Body Position: position changed independently  Taken 7/12/2023 1003 by Cailin Molina RN  Body Position: position changed independently  Taken 7/12/2023 0807 by Cailin Molina RN  Body Position: position changed independently  Skin Protection:   adhesive use limited   protective footwear used  Intervention: Prevent and Manage VTE (Venous Thromboembolism) Risk  Recent Flowsheet Documentation  Taken 7/12/2023 1218 by Cailin Molina RN  Activity Management: up ad alea  Taken 7/12/2023 1003 by Cailin Molina RN  Activity Management:    ambulated in room   ambulated outside room   ambulated to bathroom  Taken 7/12/2023 0807 by Cailin Molina RN  Activity Management: up ad alea  VTE Prevention/Management:   bilateral   sequential compression devices off  Goal: Optimal Comfort and Wellbeing  Intervention: Monitor Pain and Promote Comfort  Recent Flowsheet Documentation  Taken 7/12/2023 1624 by Cailin Molina RN  Pain Management Interventions: see MAR  Taken 7/12/2023 1432 by Cailin Molina RN  Pain Management Interventions: see MAR  Taken 7/12/2023 1148 by Cailin Molina RN  Pain Management Interventions: see MAR  Taken 7/12/2023 0959 by Cailin Molina RN  Pain Management Interventions: see MAR  Intervention: Provide Person-Centered Care  Recent Flowsheet Documentation  Taken 7/12/2023 0807 by Cailin Molina RN  Trust Relationship/Rapport:   care explained   thoughts/feelings acknowledged     Problem: Pain Acute  Goal: Acceptable Pain Control and Functional Ability  Intervention: Prevent or Manage Pain  Recent Flowsheet Documentation  Taken 7/12/2023 1624 by Cailin Molina RN  Medication Review/Management: medications reviewed  Taken 7/12/2023 1432 by Cailin Molina RN  Medication Review/Management: medications reviewed  Taken 7/12/2023 1218 by Cailin Molina RN  Medication Review/Management: medications reviewed  Taken 7/12/2023 1003 by Cailin Molina RN  Medication Review/Management: medications reviewed  Taken 7/12/2023 0807 by Cailin Molina RN  Sensory Stimulation Regulation: quiet environment promoted  Medication Review/Management: medications reviewed  Intervention: Develop Pain Management Plan  Recent Flowsheet Documentation  Taken 7/12/2023 1624 by Cailin Molina RN  Pain Management Interventions: see MAR  Taken 7/12/2023 1432 by Cailin Molina RN  Pain Management Interventions: see MAR  Taken 7/12/2023 1148 by Cailin Molina RN  Pain Management Interventions: see MAR  Taken 7/12/2023 0959 by Cailin Molina RN  Pain Management Interventions: see MAR  Intervention:  Optimize Psychosocial Wellbeing  Recent Flowsheet Documentation  Taken 7/12/2023 0807 by Cailin Molina, RN  Supportive Measures:   active listening utilized   self-care encouraged   self-reflection promoted  Diversional Activities:   television   smartphone

## 2023-07-12 NOTE — PROGRESS NOTES
"Nutrition Services    Patient Name:  Best Abdullahi  YOB: 1980  MRN: [id number]  Admit Date:  7/11/2023Assessment Date:  07/12/23    Comment: Nutrition screen per RN admission screen for decrease po intake  This is a 42 yo male who as admitted with c/o Chest pain, and N/V/D. Pt has hx of obesity and has lost wt with working on increasing exercise and healthy nutrition.  Pt is NPO at this time  IVF's at 100ml/hr    Will follow for initiation of po diet and monitor toleration.      CLINICAL NUTRITION ASSESSMENT      Reason for Assessment Nurse Admission Screen     Diagnosis/Problem     Intractable pain    Hyperglycemia    Prediabetes     Medical/Surgical History Past Medical History:   Diagnosis Date    Hx of renal calculi     Kidney stone     Obesity        Past Surgical History:   Procedure Laterality Date    TONSILLECTOMY          Encounter Information        Nutrition History:     Food Preferences:    Supplements:    Factors Affecting Intake: diarrhea, nausea, vomiting     Anthropometrics        Current Height  Current Weight  BMI kg/m2 Height: 182.9 cm (72\")  Weight: 86.6 kg (191 lb) (07/11/23 1620)  Body mass index is 25.9 kg/m².   Adjusted BMI (if applicable)    BMI Category Overweight (25 - 29.9)       Admission Weight        Ideal Body Weight (IBW) 77.6 kg   Adjusted IBW (if applicable)        Usual Body Weight (UBW) 190lb, Use to weigh 280's   Weight Change/Trend Loss, due to lifestyle changes exercising/eating better       Weight History Wt Readings from Last 30 Encounters:   07/11/23 1620 86.6 kg (191 lb)   07/11/23 1512 86.8 kg (191 lb 6.4 oz)   02/27/23 1320 96.2 kg (212 lb)   02/24/22 1416 132 kg (291 lb)   02/14/17 1055 (!) 139 kg (306 lb 4 oz)   10/20/16 1429 133 kg (292 lb 8 oz)   04/25/16 1546 128 kg (281 lb 6 oz)   03/24/16 0843 127 kg (280 lb 4 oz)   06/23/15 0843 125 kg (276 lb 4.1 oz)           --  Tests/Procedures        Tests/Procedures CT scan     Labs       Pertinent " Labs    Results from last 7 days   Lab Units 07/12/23  0453 07/11/23  1625   SODIUM mmol/L 140 137   POTASSIUM mmol/L 4.4 4.3   CHLORIDE mmol/L 107 103   CO2 mmol/L 25.0 24.0   BUN mg/dL 12 12   CREATININE mg/dL 0.82 0.77   CALCIUM mg/dL 9.5 9.8   BILIRUBIN mg/dL 1.7* 1.7*   ALK PHOS U/L 46 55   ALT (SGPT) U/L 13 18   AST (SGOT) U/L 14 15   GLUCOSE mg/dL 87 124*     Results from last 7 days   Lab Units 07/12/23  0453 07/11/23  1625   MAGNESIUM mg/dL  --  1.7   HEMOGLOBIN g/dL 13.9 15.3   HEMATOCRIT % 41.9 45.3   WBC 10*3/mm3 14.11* 11.75*   ALBUMIN g/dL 4.2 4.6     Results from last 7 days   Lab Units 07/12/23  0453 07/11/23  1625   PLATELETS 10*3/mm3 260 262     No results found for: COVID19  Lab Results   Component Value Date    HGBA1C 5.50 02/10/2023          Medications           Scheduled Medications senna-docusate sodium, 2 tablet, Oral, BID  sodium chloride, 10 mL, Intravenous, Q12H       Infusions sodium chloride, 100 mL/hr, Last Rate: 100 mL/hr (07/11/23 2253)       PRN Medications   acetaminophen **OR** acetaminophen **OR** acetaminophen    senna-docusate sodium **AND** polyethylene glycol **AND** bisacodyl **AND** bisacodyl    HYDROcodone-acetaminophen    Morphine    ondansetron    sodium chloride    sodium chloride    sodium chloride     Physical Findings          Physical Appearance alert   Oral/Mouth Cavity WNL   Edema  no edema   Gastrointestinal non-distended    Skin  skin intact   Tubes/Drains/Lines none   NFPE No clinical signs of muscle wasting or fat loss   --  Current Nutrition Orders & Evaluation of Intake       Oral Nutrition     Food Allergies NKFA   Current PO Diet NPO Diet NPO Type: Sips with Meds, Ice Chips   Supplement n/a   PO Evaluation     % PO Intake     # of Days Evaluated    --  PES STATEMENT / NUTRITION DIAGNOSIS      Nutrition Dx Problem  Problem: Nutrition Appropriate for Condition at this Time  Etiology: Factors Affecting Nutrition N/V/D  Signs/Symptoms: NPO    Comment:     --  NUTRITION INTERVENTION / PLAN OF CARE      Intervention Goal(s) Maintain nutrition status, Initiate feeding/diet, Tolerate PO , and Maintain weight         RD Intervention/Action Follow Tx Progress     --      Monitor/Evaluation Per protocol   Discharge Plan/Needs Pending clinical course   Education Will instruct as appropriate   --    RD to follow per protocol.      Electronically signed by:  Marina Hung RD  07/12/23 12:26 EDT

## 2023-07-12 NOTE — CASE MANAGEMENT/SOCIAL WORK
Discharge Planning Assessment  [facility]     Patient Name: Best Abdullahi  MRN: [id number]  Today's Date: 7/12/2023    Admit Date: 7/11/2023    Plan: Home with family support.   Discharge Needs Assessment       Row Name 07/12/23 1535       Living Environment    People in Home spouse    Name(s) of People in Home Wife/Di.    Current Living Arrangements home    Primary Care Provided by self    Provides Primary Care For no one    Family Caregiver if Needed spouse    Quality of Family Relationships helpful;involved;supportive    Able to Return to Prior Arrangements yes       Resource/Environmental Concerns    Transportation Concerns none       Transition Planning    Patient/Family Anticipates Transition to home with family    Patient/Family Anticipated Services at Transition     Transportation Anticipated family or friend will provide       Discharge Needs Assessment    Readmission Within the Last 30 Days no previous admission in last 30 days    Equipment Currently Used at Home none                   Discharge Plan       Row Name 07/12/23 1536       Plan    Plan Home with family support.    Patient/Family in Agreement with Plan yes    Plan Comments Spoke with the patient and his wife/Di, verified current information and explained the role of the CCP. Patient said he lives with Di and has family support. He's IADL and has no history with DME/RH/HH. Patient plans to d/c home with family support. CCP will follow for discharge needs.                  Continued Care and Services - Admitted Since 7/11/2023    Coordination has not been started for this encounter.          Demographic Summary       Row Name 07/12/23 1535       General Information    Admission Type inpatient    Reason for Consult discharge planning    Preferred Language English       Contact Information    Permission Granted to Share Info With ;family/designee                   Functional Status       Row Name 07/12/23  1535       Functional Status    Usual Activity Tolerance good       Functional Status, IADL    Medications independent    Meal Preparation independent    Housekeeping independent    Laundry independent    Shopping independent       Mental Status    General Appearance WDL WDL       Mental Status Summary    Recent Changes in Mental Status/Cognitive Functioning no changes                   Psychosocial       Row Name 07/12/23 1535       Intellectual Performance WDL    Level of Consciousness Alert       Coping/Stress    Patient Personal Strengths able to adapt    Sources of Support spouse    Reaction to Health Status accepting    Understanding of Condition and Treatment adequate understanding of medical condition;adequate understanding of treatment       Developmental Stage (Eriksson's)    Developmental Stage Stage 7 (35-65 years/Middle Adulthood) Generativity vs. Stagnation                    Melany OCAMPO RN

## 2023-07-13 ENCOUNTER — APPOINTMENT (OUTPATIENT)
Dept: ULTRASOUND IMAGING | Facility: HOSPITAL | Age: 43
DRG: 700 | End: 2023-07-13
Payer: COMMERCIAL

## 2023-07-13 PROBLEM — R10.32 GROIN PAIN, LEFT: Status: ACTIVE | Noted: 2023-07-13

## 2023-07-13 LAB
ADV 40+41 DNA STL QL NAA+NON-PROBE: NOT DETECTED
ANION GAP SERPL CALCULATED.3IONS-SCNC: 7 MMOL/L (ref 5–15)
ASTRO TYP 1-8 RNA STL QL NAA+NON-PROBE: NOT DETECTED
BASOPHILS # BLD AUTO: 0.05 10*3/MM3 (ref 0–0.2)
BASOPHILS NFR BLD AUTO: 0.5 % (ref 0–1.5)
BUN SERPL-MCNC: 11 MG/DL (ref 6–20)
BUN/CREAT SERPL: 13.8 (ref 7–25)
C CAYETANENSIS DNA STL QL NAA+NON-PROBE: NOT DETECTED
C COLI+JEJ+UPSA DNA STL QL NAA+NON-PROBE: NOT DETECTED
CALCIUM SPEC-SCNC: 8.7 MG/DL (ref 8.6–10.5)
CHLORIDE SERPL-SCNC: 104 MMOL/L (ref 98–107)
CO2 SERPL-SCNC: 26 MMOL/L (ref 22–29)
CREAT SERPL-MCNC: 0.8 MG/DL (ref 0.76–1.27)
CRYPTOSP DNA STL QL NAA+NON-PROBE: NOT DETECTED
DEPRECATED RDW RBC AUTO: 40.2 FL (ref 37–54)
E HISTOLYT DNA STL QL NAA+NON-PROBE: NOT DETECTED
EAEC PAA PLAS AGGR+AATA ST NAA+NON-PRB: NOT DETECTED
EC STX1+STX2 GENES STL QL NAA+NON-PROBE: NOT DETECTED
EGFRCR SERPLBLD CKD-EPI 2021: 112.6 ML/MIN/1.73
EOSINOPHIL # BLD AUTO: 0.01 10*3/MM3 (ref 0–0.4)
EOSINOPHIL NFR BLD AUTO: 0.1 % (ref 0.3–6.2)
EPEC EAE GENE STL QL NAA+NON-PROBE: NOT DETECTED
ERYTHROCYTE [DISTWIDTH] IN BLOOD BY AUTOMATED COUNT: 12.7 % (ref 12.3–15.4)
ETEC LTA+ST1A+ST1B TOX ST NAA+NON-PROBE: NOT DETECTED
G LAMBLIA DNA STL QL NAA+NON-PROBE: NOT DETECTED
GLUCOSE SERPL-MCNC: 73 MG/DL (ref 65–99)
HCT VFR BLD AUTO: 37.1 % (ref 37.5–51)
HGB BLD-MCNC: 12.5 G/DL (ref 13–17.7)
IMM GRANULOCYTES # BLD AUTO: 0.06 10*3/MM3 (ref 0–0.05)
IMM GRANULOCYTES NFR BLD AUTO: 0.6 % (ref 0–0.5)
LYMPHOCYTES # BLD AUTO: 2.1 10*3/MM3 (ref 0.7–3.1)
LYMPHOCYTES NFR BLD AUTO: 20.9 % (ref 19.6–45.3)
MCH RBC QN AUTO: 29.5 PG (ref 26.6–33)
MCHC RBC AUTO-ENTMCNC: 33.7 G/DL (ref 31.5–35.7)
MCV RBC AUTO: 87.5 FL (ref 79–97)
MONOCYTES # BLD AUTO: 0.64 10*3/MM3 (ref 0.1–0.9)
MONOCYTES NFR BLD AUTO: 6.4 % (ref 5–12)
NEUTROPHILS NFR BLD AUTO: 7.21 10*3/MM3 (ref 1.7–7)
NEUTROPHILS NFR BLD AUTO: 71.5 % (ref 42.7–76)
NOROVIRUS GI+II RNA STL QL NAA+NON-PROBE: NOT DETECTED
NRBC BLD AUTO-RTO: 0 /100 WBC (ref 0–0.2)
P SHIGELLOIDES DNA STL QL NAA+NON-PROBE: NOT DETECTED
PLATELET # BLD AUTO: 212 10*3/MM3 (ref 140–450)
PMV BLD AUTO: 11.1 FL (ref 6–12)
POTASSIUM SERPL-SCNC: 4.1 MMOL/L (ref 3.5–5.2)
RBC # BLD AUTO: 4.24 10*6/MM3 (ref 4.14–5.8)
RVA RNA STL QL NAA+NON-PROBE: NOT DETECTED
S ENT+BONG DNA STL QL NAA+NON-PROBE: NOT DETECTED
SAPO I+II+IV+V RNA STL QL NAA+NON-PROBE: NOT DETECTED
SHIGELLA SP+EIEC IPAH ST NAA+NON-PROBE: NOT DETECTED
SODIUM SERPL-SCNC: 137 MMOL/L (ref 136–145)
V CHOL+PARA+VUL DNA STL QL NAA+NON-PROBE: NOT DETECTED
V CHOLERAE DNA STL QL NAA+NON-PROBE: NOT DETECTED
WBC NRBC COR # BLD: 10.07 10*3/MM3 (ref 3.4–10.8)
Y ENTEROCOL DNA STL QL NAA+NON-PROBE: NOT DETECTED

## 2023-07-13 PROCEDURE — 93976 VASCULAR STUDY: CPT

## 2023-07-13 PROCEDURE — 25010000002 MORPHINE PER 10 MG: Performed by: NURSE PRACTITIONER

## 2023-07-13 PROCEDURE — 25010000002 MORPHINE PER 10 MG: Performed by: INTERNAL MEDICINE

## 2023-07-13 PROCEDURE — 85025 COMPLETE CBC W/AUTO DIFF WBC: CPT | Performed by: STUDENT IN AN ORGANIZED HEALTH CARE EDUCATION/TRAINING PROGRAM

## 2023-07-13 PROCEDURE — 76870 US EXAM SCROTUM: CPT

## 2023-07-13 PROCEDURE — 25010000002 KETOROLAC TROMETHAMINE PER 15 MG: Performed by: NURSE PRACTITIONER

## 2023-07-13 PROCEDURE — 80048 BASIC METABOLIC PNL TOTAL CA: CPT | Performed by: STUDENT IN AN ORGANIZED HEALTH CARE EDUCATION/TRAINING PROGRAM

## 2023-07-13 RX ORDER — MORPHINE SULFATE 2 MG/ML
2 INJECTION, SOLUTION INTRAMUSCULAR; INTRAVENOUS EVERY 4 HOURS PRN
Status: DISCONTINUED | OUTPATIENT
Start: 2023-07-13 | End: 2023-07-14

## 2023-07-13 RX ORDER — KETOROLAC TROMETHAMINE 30 MG/ML
30 INJECTION, SOLUTION INTRAMUSCULAR; INTRAVENOUS EVERY 6 HOURS PRN
Status: DISCONTINUED | OUTPATIENT
Start: 2023-07-13 | End: 2023-07-14 | Stop reason: HOSPADM

## 2023-07-13 RX ADMIN — HYDROCODONE BITARTRATE AND ACETAMINOPHEN 1 TABLET: 10; 325 TABLET ORAL at 06:10

## 2023-07-13 RX ADMIN — HYDROCODONE BITARTRATE AND ACETAMINOPHEN 1 TABLET: 10; 325 TABLET ORAL at 10:59

## 2023-07-13 RX ADMIN — MORPHINE SULFATE 4 MG: 2 INJECTION, SOLUTION INTRAMUSCULAR; INTRAVENOUS at 00:19

## 2023-07-13 RX ADMIN — Medication 10 ML: at 08:37

## 2023-07-13 RX ADMIN — HYDROCODONE BITARTRATE AND ACETAMINOPHEN 1 TABLET: 10; 325 TABLET ORAL at 02:36

## 2023-07-13 RX ADMIN — KETOROLAC TROMETHAMINE 30 MG: 30 INJECTION, SOLUTION INTRAMUSCULAR; INTRAVENOUS at 20:07

## 2023-07-13 RX ADMIN — MORPHINE SULFATE 4 MG: 2 INJECTION, SOLUTION INTRAMUSCULAR; INTRAVENOUS at 08:31

## 2023-07-13 RX ADMIN — HYDROCODONE BITARTRATE AND ACETAMINOPHEN 1 TABLET: 10; 325 TABLET ORAL at 18:02

## 2023-07-13 RX ADMIN — KETOROLAC TROMETHAMINE 30 MG: 30 INJECTION, SOLUTION INTRAMUSCULAR; INTRAVENOUS at 13:35

## 2023-07-13 RX ADMIN — MORPHINE SULFATE 2 MG: 2 INJECTION, SOLUTION INTRAMUSCULAR; INTRAVENOUS at 20:08

## 2023-07-13 RX ADMIN — MORPHINE SULFATE 4 MG: 2 INJECTION, SOLUTION INTRAMUSCULAR; INTRAVENOUS at 04:38

## 2023-07-13 RX ADMIN — SODIUM CHLORIDE 100 ML/HR: 9 INJECTION, SOLUTION INTRAVENOUS at 11:07

## 2023-07-13 RX ADMIN — SODIUM CHLORIDE 100 ML/HR: 9 INJECTION, SOLUTION INTRAVENOUS at 00:19

## 2023-07-13 RX ADMIN — Medication 10 ML: at 20:08

## 2023-07-13 RX ADMIN — HYDROCODONE BITARTRATE AND ACETAMINOPHEN 1 TABLET: 10; 325 TABLET ORAL at 22:06

## 2023-07-13 RX ADMIN — SODIUM CHLORIDE 100 ML/HR: 9 INJECTION, SOLUTION INTRAVENOUS at 14:27

## 2023-07-13 RX ADMIN — MORPHINE SULFATE 2 MG: 2 INJECTION, SOLUTION INTRAMUSCULAR; INTRAVENOUS at 15:29

## 2023-07-13 NOTE — PLAN OF CARE
Goal Outcome Evaluation:  Plan of Care Reviewed With: patient        Progress: improving  Outcome Evaluation: patient up ad alea, continues to complain of LLQ pain, IV and and PO medication given for pain, stool sample sent, no urinary retention, IVF's continued, tolerating clear liquid diet, educated on medication management

## 2023-07-13 NOTE — CONSULTS
"  [facility]   First Urology Consult Note    Patient Name: Best Abdullahi  : 1980  MRN: [id number]  Primary Care Physician:  Katt Lopez APRN  Referring Physician: JERRI Waldron  Date of admission: 2023    Subjective   Subjective     Reason for Consult/ Chief Complaint: groin pain    HPI:  Best Abdullahi is a 43 y.o. male with history of substance use (prior \"brain bleed\"), kidney stones and obesity who presented  with vomiting, body aches, chest pain, and diarrhea. Concerns for GI-related symptoms initially, however symptoms have evolved throughout admission. This morning he denied abdominal pain and began endorsing left inguinal canal/groin pain. Pt has history of stones and states this feels similar to prior stone episodes. CT was obtained which showed no left ureteral or renal stones present, no hydronephrosis, no  abnormality bilaterally. Scrotal US showed bl small hydroceles but otherwise normal findings. Pt denies any fevers, chills, nausea, vomiting. States the only thing that helps his pain is pain medicine.     Review of Systems   All systems were reviewed and negative except for: left sided abdominal and groin pain     Personal History     Past Medical History:   Diagnosis Date    Hx of renal calculi     Kidney stone     Obesity        Past Surgical History:   Procedure Laterality Date    TONSILLECTOMY         Family History: family history includes Cerebral palsy in his half-brother; Emphysema in his paternal grandfather; Gout in his father; Hypertension in his mother; No Known Problems in his daughter, maternal grandmother, paternal grandmother, and son; Stroke in his maternal grandfather. Otherwise pertinent FHx was reviewed and not pertinent to current issue.    Social History:  reports that he quit smoking about 10 years ago. His smoking use included cigarettes. He has a 10.00 pack-year smoking history. He has never used smokeless tobacco. He reports current " alcohol use. He reports current drug use. Drug: Marijuana.    Home Medications:  meloxicam    Allergies:  No Known Allergies    Objective    Objective     Vitals:   Temp:  [97.4 °F (36.3 °C)-98.4 °F (36.9 °C)] 98.3 °F (36.8 °C)  Heart Rate:  [50-74] 50  Resp:  [16-17] 16  BP: (106-167)/(61-74) 126/74    Physical Exam:  Constitutional: oriented, NAD   Eyes: PERRLA, sclerae anicteric   HENT: NCAT, mucous membranes moist   Neck: supple, trachea midline   Respiratory: nonlabored respirations    Cardiovascular: RRR, warm to touch   Gastrointestinal: soft, nontender, nondistended   Musculoskeletal: no clubbing or cyanosis to extremities   Neurologic: AAOx3, no obvious neurologic abnormality    : mild left flank and left abdominal pain, pain upon palpation along the left inguinal region, no obvious hernia or bulge appreciated, no LAD; bl testes palpable and nontender without abnormality, penis circumcised with orthotopic patent meatus     Result Review    Result Review:  I have personally reviewed the results from the time of this admission to 7/13/2023 16:40 EDT and agree with these findings:  [x]  Laboratory list / accordion  [x]  Microbiology  [x]  Radiology  []  EKG/Telemetry   []  Cardiology/Vascular   []  Pathology  []  Old records  []  Other:  Most notable findings include: Imaging without acute  abnormality       Assessment & Plan   Assessment / Plan     Brief Patient Summary:  Best Abdullahi is a 43 y.o. male who complains of left inguinal/groin pain, prior hx of kidney stones     Active Hospital Problems:  Active Hospital Problems    Diagnosis     **Groin pain, left     Intractable pain     Prediabetes     Hyperglycemia      Cr 0.8  WBC 14  UA 7/11 negative, no blood     CT A/P reviewed, no hydro bl, no stones bilaterally, no obvious  abnormality   Scrotal US: small hydroceles bl but otherwise normal testes with normal flow     Plan:   - No stones on CT, no hydro bl   - UA not infected   - Scrotal  ultrasound and physical exam benign   - No evidence of  source of pt's symptoms   - No further plans from Urology standpoint   - Will defer further workup to primary, no need for follow up     Please see attending attestation for final plan.     Dillon Vaughn MD

## 2023-07-13 NOTE — NURSING NOTE
Spoke with Dr. Walls about new consult, will see late today or tomorrow.  Added ultrasound scrotum to ultrasound of pelvis.

## 2023-07-13 NOTE — PROGRESS NOTES
Name: Best Abdullahi ADMIT: 2023   : 1980  PCP: Katt Lopez APRN    MRN: [id number] LOS: 2 days   AGE/SEX: 43 y.o. male  ROOM: [facility]     Subjective   Subjective      Patient seen and examined with wife at bedside. He no longer has abdominal pain. His pain is now in his left groin area radiating to penis. No scrotal pain. Associated urinary urgency pressure and sense of incomplete evacuation. He tells RN it feels like I have stones. No fever or chills. Urine is clear.        Objective   Objective   Vital Signs  Temp:  [97.4 °F (36.3 °C)-98.4 °F (36.9 °C)] 98.4 °F (36.9 °C)  Heart Rate:  [60-76] 64  Resp:  [16-17] 16  BP: (106-167)/(61-69) 126/69  SpO2:  [95 %-99 %] 98 %  on   ;   Device (Oxygen Therapy): room air  Body mass index is 25.9 kg/m².  Physical Exam  Vitals reviewed.   Constitutional:       Appearance: He is well-developed.   HENT:      Head: Normocephalic and atraumatic.      Mouth/Throat:      Mouth: Mucous membranes are moist.   Cardiovascular:      Rate and Rhythm: Normal rate and regular rhythm.   Pulmonary:      Effort: Pulmonary effort is normal. No respiratory distress.      Breath sounds: Normal breath sounds.   Abdominal:      General: Abdomen is flat. Bowel sounds are normal. There is no distension.      Palpations: Abdomen is soft.      Tenderness: There is no abdominal tenderness.   Genitourinary:     Penis: Normal.       Testes: Normal.      Comments: Left inguinal canal without obvious hernia but tender to light palpation. + suprapubic tenderness  Skin:     General: Skin is warm and dry.   Neurological:      General: No focal deficit present.      Mental Status: He is alert and oriented to person, place, and time.   Psychiatric:         Mood and Affect: Mood normal.         Behavior: Behavior normal.         Thought Content: Thought content normal.     Results Review     I reviewed the patient's new clinical results.  Results from last 7 days   Lab Units  07/13/23  0509 07/12/23  0453 07/11/23  1625   WBC 10*3/mm3 10.07 14.11* 11.75*   HEMOGLOBIN g/dL 12.5* 13.9 15.3   PLATELETS 10*3/mm3 212 260 262     Results from last 7 days   Lab Units 07/13/23  0509 07/12/23  0453 07/11/23  1625   SODIUM mmol/L 137 140 137   POTASSIUM mmol/L 4.1 4.4 4.3   CHLORIDE mmol/L 104 107 103   CO2 mmol/L 26.0 25.0 24.0   BUN mg/dL 11 12 12   CREATININE mg/dL 0.80 0.82 0.77   GLUCOSE mg/dL 73 87 124*   EGFR mL/min/1.73 112.6 111.8 113.9     Results from last 7 days   Lab Units 07/12/23  0453 07/11/23  1625   ALBUMIN g/dL 4.2 4.6   BILIRUBIN mg/dL 1.7* 1.7*   ALK PHOS U/L 46 55   AST (SGOT) U/L 14 15   ALT (SGPT) U/L 13 18     Results from last 7 days   Lab Units 07/13/23  0509 07/12/23  0453 07/11/23  1625   CALCIUM mg/dL 8.7 9.5 9.8   ALBUMIN g/dL  --  4.2 4.6   MAGNESIUM mg/dL  --   --  1.7     Results from last 7 days   Lab Units 07/12/23  0453   PROCALCITONIN ng/mL 0.03   LACTATE mmol/L 1.0     No results found for: HGBA1C, POCGLU    No radiology results for the last day  I have personally reviewed all medications:  Scheduled Medications  senna-docusate sodium, 2 tablet, Oral, BID  sodium chloride, 10 mL, Intravenous, Q12H    Infusions  sodium chloride, 100 mL/hr, Last Rate: 100 mL/hr (07/13/23 1107)    Diet  Diet: Regular/House Diet; Texture: Regular Texture (IDDSI 7); Fluid Consistency: Thin (IDDSI 0)    I have personally reviewed:  [x]  Laboratory   [x]  Microbiology   [x]  Radiology   [x]  EKG/Telemetry  [x]  Cardiology/Vascular   []  Pathology    []  Records       Assessment/Plan     Active Hospital Problems    Diagnosis  POA    **Groin pain, left [R10.32]  Yes    Intractable pain [R52]  Yes    Prediabetes [R73.03]  Yes    Hyperglycemia [R73.9]  Yes      Resolved Hospital Problems   No resolved problems to display.       43 y.o. male admitted with Groin pain, left.   Initally present with abdomina pain involving predominantly in the left lower abdominal quadrant and left lower  back.  Initial CT abdomen pelvis with contrast did not show any evidence of acute intra-abdominal pathology, but did show findings of a right renal cortical cyst and 4 mm noncalcified pulmonary nodule along the left major fissure.  CT angiogram of the chest was obtained, was negative for evidence of pulmonary embolism.    Urinalysis was obtained, did not show any evidence of findings consistent with urinary tract infection.  Because patient endorsed feeling of similar symptoms in the past when he was found to have kidney stones, another  CT abdomen pelvis without contrast was ordered yesterday did not show any evidence of nephrolithiasis or obstructive uropathy.     7/13 Today he denies abdominal pain. He does have pain on exam left inguinal canal/ pelvis with pressure and urgency. He is urinating but check bladder scan PVR. Consult urology. Check pelvis US. Continue analgesics. Measure UOP     WBC trending down without abx. Continue iVF for now          SCDs for DVT prophylaxis.  Full code.  Discussed with patient, spouse, and nursing staff.  Anticipate discharge  TBD      JERRI Nelson  [facility]  07/13/23  13:03 EDT

## 2023-07-14 VITALS
OXYGEN SATURATION: 99 % | TEMPERATURE: 97.9 F | HEART RATE: 69 BPM | HEIGHT: 72 IN | WEIGHT: 191 LBS | BODY MASS INDEX: 25.87 KG/M2 | DIASTOLIC BLOOD PRESSURE: 71 MMHG | SYSTOLIC BLOOD PRESSURE: 153 MMHG | RESPIRATION RATE: 17 BRPM

## 2023-07-14 PROBLEM — S76.212A STRAIN OF LEFT GROIN: Status: ACTIVE | Noted: 2023-07-14

## 2023-07-14 PROBLEM — R52 INTRACTABLE PAIN: Status: RESOLVED | Noted: 2023-07-11 | Resolved: 2023-07-14

## 2023-07-14 LAB
ANION GAP SERPL CALCULATED.3IONS-SCNC: 7.5 MMOL/L (ref 5–15)
BASOPHILS # BLD AUTO: 0.04 10*3/MM3 (ref 0–0.2)
BASOPHILS NFR BLD AUTO: 0.5 % (ref 0–1.5)
BUN SERPL-MCNC: 11 MG/DL (ref 6–20)
BUN/CREAT SERPL: 16.7 (ref 7–25)
CALCIUM SPEC-SCNC: 8.7 MG/DL (ref 8.6–10.5)
CHLORIDE SERPL-SCNC: 106 MMOL/L (ref 98–107)
CO2 SERPL-SCNC: 25.5 MMOL/L (ref 22–29)
CREAT SERPL-MCNC: 0.66 MG/DL (ref 0.76–1.27)
DEPRECATED RDW RBC AUTO: 40.5 FL (ref 37–54)
EGFRCR SERPLBLD CKD-EPI 2021: 119.4 ML/MIN/1.73
EOSINOPHIL # BLD AUTO: 0.04 10*3/MM3 (ref 0–0.4)
EOSINOPHIL NFR BLD AUTO: 0.5 % (ref 0.3–6.2)
ERYTHROCYTE [DISTWIDTH] IN BLOOD BY AUTOMATED COUNT: 12.9 % (ref 12.3–15.4)
GLUCOSE SERPL-MCNC: 95 MG/DL (ref 65–99)
HCT VFR BLD AUTO: 39.4 % (ref 37.5–51)
HGB BLD-MCNC: 13 G/DL (ref 13–17.7)
IMM GRANULOCYTES # BLD AUTO: 0.02 10*3/MM3 (ref 0–0.05)
IMM GRANULOCYTES NFR BLD AUTO: 0.3 % (ref 0–0.5)
LYMPHOCYTES # BLD AUTO: 2.99 10*3/MM3 (ref 0.7–3.1)
LYMPHOCYTES NFR BLD AUTO: 38.6 % (ref 19.6–45.3)
MCH RBC QN AUTO: 28.7 PG (ref 26.6–33)
MCHC RBC AUTO-ENTMCNC: 33 G/DL (ref 31.5–35.7)
MCV RBC AUTO: 87 FL (ref 79–97)
MONOCYTES # BLD AUTO: 0.67 10*3/MM3 (ref 0.1–0.9)
MONOCYTES NFR BLD AUTO: 8.7 % (ref 5–12)
NEUTROPHILS NFR BLD AUTO: 3.98 10*3/MM3 (ref 1.7–7)
NEUTROPHILS NFR BLD AUTO: 51.4 % (ref 42.7–76)
NRBC BLD AUTO-RTO: 0 /100 WBC (ref 0–0.2)
PLATELET # BLD AUTO: 204 10*3/MM3 (ref 140–450)
PMV BLD AUTO: 11.1 FL (ref 6–12)
POTASSIUM SERPL-SCNC: 3.8 MMOL/L (ref 3.5–5.2)
RBC # BLD AUTO: 4.53 10*6/MM3 (ref 4.14–5.8)
SODIUM SERPL-SCNC: 139 MMOL/L (ref 136–145)
WBC NRBC COR # BLD: 7.74 10*3/MM3 (ref 3.4–10.8)

## 2023-07-14 PROCEDURE — 85025 COMPLETE CBC W/AUTO DIFF WBC: CPT | Performed by: STUDENT IN AN ORGANIZED HEALTH CARE EDUCATION/TRAINING PROGRAM

## 2023-07-14 PROCEDURE — 80048 BASIC METABOLIC PNL TOTAL CA: CPT | Performed by: STUDENT IN AN ORGANIZED HEALTH CARE EDUCATION/TRAINING PROGRAM

## 2023-07-14 PROCEDURE — 25010000002 KETOROLAC TROMETHAMINE PER 15 MG: Performed by: NURSE PRACTITIONER

## 2023-07-14 PROCEDURE — 25010000002 MORPHINE PER 10 MG: Performed by: NURSE PRACTITIONER

## 2023-07-14 RX ORDER — CYCLOBENZAPRINE HCL 10 MG
10 TABLET ORAL 3 TIMES DAILY PRN
Qty: 15 TABLET | Refills: 0 | Status: SHIPPED | OUTPATIENT
Start: 2023-07-14 | End: 2023-07-19

## 2023-07-14 RX ORDER — ACETAMINOPHEN 325 MG/1
650 TABLET ORAL EVERY 4 HOURS PRN
Start: 2023-07-14

## 2023-07-14 RX ORDER — CYCLOBENZAPRINE HCL 10 MG
10 TABLET ORAL 3 TIMES DAILY PRN
Status: DISCONTINUED | OUTPATIENT
Start: 2023-07-14 | End: 2023-07-14 | Stop reason: HOSPADM

## 2023-07-14 RX ADMIN — Medication 10 ML: at 09:16

## 2023-07-14 RX ADMIN — MORPHINE SULFATE 2 MG: 2 INJECTION, SOLUTION INTRAMUSCULAR; INTRAVENOUS at 00:01

## 2023-07-14 RX ADMIN — SENNOSIDES AND DOCUSATE SODIUM 2 TABLET: 50; 8.6 TABLET ORAL at 09:15

## 2023-07-14 RX ADMIN — HYDROCODONE BITARTRATE AND ACETAMINOPHEN 1 TABLET: 10; 325 TABLET ORAL at 11:51

## 2023-07-14 RX ADMIN — MORPHINE SULFATE 2 MG: 2 INJECTION, SOLUTION INTRAMUSCULAR; INTRAVENOUS at 04:31

## 2023-07-14 RX ADMIN — HYDROCODONE BITARTRATE AND ACETAMINOPHEN 1 TABLET: 10; 325 TABLET ORAL at 02:44

## 2023-07-14 RX ADMIN — HYDROCODONE BITARTRATE AND ACETAMINOPHEN 1 TABLET: 10; 325 TABLET ORAL at 07:25

## 2023-07-14 RX ADMIN — SODIUM CHLORIDE 100 ML/HR: 9 INJECTION, SOLUTION INTRAVENOUS at 02:56

## 2023-07-14 RX ADMIN — KETOROLAC TROMETHAMINE 30 MG: 30 INJECTION, SOLUTION INTRAMUSCULAR; INTRAVENOUS at 09:15

## 2023-07-14 RX ADMIN — KETOROLAC TROMETHAMINE 30 MG: 30 INJECTION, SOLUTION INTRAMUSCULAR; INTRAVENOUS at 02:45

## 2023-07-14 NOTE — PLAN OF CARE
Goal Outcome Evaluation:            Pt is alert and oriented and able tot make needs known. Pt up independtly with activity and voices needs

## 2023-07-14 NOTE — PLAN OF CARE
Goal Outcome Evaluation:  Plan of Care Reviewed With: patient        Progress: no change  Outcome Evaluation: VSS, RA, NS@100, voiding per BRP, pain 7/10 with both PO and IV meds given, up ad alea, home on DC when stable

## 2023-07-14 NOTE — DISCHARGE SUMMARY
Patient Name: Best Abdullahi  : 1980  MRN: [id number]    Date of Admission: 2023  Date of Discharge:  2023  Primary Care Physician: Katt Lopez APRN      Chief Complaint:   Abdominal Pain      Discharge Diagnoses     Active Hospital Problems    Diagnosis  POA    **Groin pain, left [R10.32]  Yes    Strain of left groin [S76.212A]  Yes    Prediabetes [R73.03]  Yes    Hyperglycemia [R73.9]  Yes      Resolved Hospital Problems    Diagnosis Date Resolved POA    Intractable pain [R52] 2023 Yes        Hospital Course     Mr. Abdullahi is a 43 y.o. male with a history of prediabetes that presented with left lower quadrant abdominal pain, left lower back pain and chest pain.  Work-up has been unremarkable for any I source of his symptoms.  He did have an elevated WBC on admission but no other signs or symptoms of infection with normal procal and lactic.  His labs have otherwise remained stable.  CTA chest was negative for pulmonary embolism or any other acute issue.  CT abdomen pelvis was doubt any acute intra-abdominal process and no evidence of nephrolithiasis.  Stool studies and blood cultures also negative.  His urinalysis did not show any evidence of findings consistent with urinary tract infection.  His abdominal pain resolved but he then started complaining of symptoms similar to when he had kidney stones in the past.  Repeat CT abdomen pelvis was obtained and again did not show any evidence of nephrolithiasis or obstructive uropathy.  On exam yesterday he denied abdominal pain but it developed left inguinal canal pain with a sensation of pressure and urgency when urinating.  Scrotal ultrasound was obtained with small hydroceles bilaterally but otherwise normal testes with normal flow.  Urology was consulted and did not identify a  source of his pain. Again his abdominal pain resolved so GI was not consulted.     This morning he is still having complaints of left inguinal/groin pain  "but he informs me that prior to onset of his abdominal/ groin pain he completed a strenuous \"legs workout\" and had vigorous sexual activity with spouse.  Given extensive unremarkable work-up, I suspect his pain is musculoskeletal in nature.  He will be treated symptomatically and has been advised to follow-up with his PCP if pain persist but there is no indication to prolong admission.       Day of Discharge     Subjective:  Still complaining of left groin pain but denies LUTS, fever, chills, nausea, vomiting, diarrhea    Physical Exam:  Temp:  [97.4 °F (36.3 °C)-98.6 °F (37 °C)] 97.4 °F (36.3 °C)  Heart Rate:  [50-69] 54  Resp:  [16] 16  BP: (124-154)/(69-75) 124/74  Body mass index is 25.9 kg/m².  Physical Exam  Vitals reviewed.   Constitutional:       Appearance: He is well-developed.   Cardiovascular:      Rate and Rhythm: Normal rate and regular rhythm.   Abdominal:      General: Bowel sounds are normal. There is no distension.      Palpations: Abdomen is soft. There is no mass.      Tenderness: There is no abdominal tenderness.      Hernia: No hernia is present.   Genitourinary:     Penis: Normal.       Testes: Normal.      Comments: Left inguinal canal tenderness but no acute abnormality   Skin:     General: Skin is warm and dry.   Neurological:      Mental Status: He is alert and oriented to person, place, and time.   Psychiatric:         Behavior: Behavior normal.         Thought Content: Thought content normal.         Judgment: Judgment normal.       Consultants     Consult Orders (all) (From admission, onward)       Start     Ordered    07/13/23 1246  Inpatient Urology Consult  Once        Specialty:  Urology  Provider:  Yousif Whiting Jr., MD    07/13/23 1246    07/13/23 0908  Inpatient Gastroenterology Consult  Once,   Status:  Canceled        Specialty:  Gastroenterology  Provider:  Pedro Stroud MD    07/13/23 0907                  Procedures     * Surgery not found *    Imaging Results " (All)       Procedure Component Value Units Date/Time    US Scrotum & Testicles with doppler [[id number]] Collected: 07/13/23 1655     Updated: 07/13/23 1659    Narrative:      Examination: Scrotal sonogram     Scrotal Doppler     TECHNIQUE: Sonographic images of the scrotum were obtained using  grayscale, color Doppler, and spectral Doppler imaging     HISTORY:Left inguinal and scrotal pain     COMPARISON: None available     FINDINGS: The testicles and epididymides are normal in appearance,  without mass and with Doppler flow, the right testicle measuring 4.5 x 3  x 2.4 cm, and the left testicle measuring 4.6 x 2.5 x 3.2 cm. There is a  tiny left epididymal head cyst. There are small bilateral hydroceles. No  left inguinal hernia is seen.       Impression:      Small bilateral hydroceles. Otherwise normal scrotal  sonogram.     This report was finalized on 7/13/2023 4:56 PM by Dr. Will Blanchard M.D.       CT Abdomen Pelvis Stone Protocol [[id number]] Collected: 07/12/23 1738     Updated: 07/12/23 1747    Narrative:      CT ABDOMEN PELVIS STONE PROTOCOL-     Radiation dose reduction techniques were utilized, including automated  exposure control and exposure modulation based on body size.           Clinical: Sudden onset of left-sided flank pain     COMPARISON 07/11/2023     FINDINGS: The lung bases have a satisfactory appearance. No renal  calculus or obstructive uropathy is demonstrated. There is a nominal  amount of perinephric fat stranding which is symmetric and nonspecific.  Both adrenal glands have a satisfactory appearance. The spleen is  normal. The gastric contour is within normal limits, no duodenal  abnormality seen. The appendix, colon and small bowel have a normal  appearance. No induration of the mesentery to suggest an inflammatory  process with special attention to the left abdomen.     The liver is satisfactory in appearance. Increased density within the  gallbladder, vicarious excretion of contrast  material injected on  07/11/2023. Pancreas is normal. The aorta is normal in course and  caliber. Urinary bladder is collapsed, contour within normal limits.     CONCLUSION: No acute intra-abdominal abnormality with special attention  to the left kidney and left abdominal viscera.        This report was finalized on 7/12/2023 5:44 PM by Dr. Juancho Seo M.D.       CT Angiogram Chest Pulmonary Embolism [[id number]] Collected: 07/11/23 1810     Updated: 07/11/23 1828    Narrative:      CT ANGIOGRAM CHEST PULMONARY EMBOLISM-, CT ABDOMEN PELVIS W CONTRAST-     Radiation dose reduction techniques were utilized, including automated  exposure control and exposure modulation based on body size.     Clinical: Chest pain, left flank and left upper quadrant pain     CT scan of the chest performed with intravenous contrast, pulmonary  embolus protocol to include coronal, sagittal and three-dimensional  reconstruction.     COMPARISON: None     FINDINGS: No aortic aneurysm nor dissection. The esophagus is normal. No  pulmonary embolus. Cardiac size within normal limits. No mediastinal or  hilar mass/lymphadenopathy.     Located on the left major fissure on image #78 is a 4 mm noncalcified  pulmonary nodule or node. The lungs are otherwise clear. No pulmonary  edema or pleural effusion seen. Tracheobronchial tree is satisfactory in  appearance.     The liver, gallbladder, pancreas and spleen are satisfactory in  appearance. No biliary duct dilatation. Both adrenal glands are normal.  The renal cortical pattern of enhancement is symmetric and satisfactory.  There is a 4 mm right renal cortical cysts, the kidneys are otherwise  unremarkable. No calculus or obstructive uropathy, the ureters are  normal in course and caliber to the urinary bladder which is  satisfactory in appearance.     The appendix, colon and small bowel have a normal appearance. Caliber  within normal limits, no induration of the mesentery to suggest  an  inflammatory process with special attention to the left abdomen. No free  intraperitoneal air nor free fluid seen. Stomach and duodenum have a  satisfactory appearance. Caliber the aorta is normal.     CONCLUSION:  1. No acute intrathoracic abnormality, specifically no pulmonary embolus  or aortic dissection.  2. 4 mm noncalcified pulmonary nodule or node, along the left major  fissure, one year follow-up CT chest advised.  3. No acute intra-abdominal abnormality.  4. Tiny right renal cortical cyst.        This report was finalized on 7/11/2023 6:25 PM by Dr. Juancho Seo M.D.       CT Abdomen Pelvis With Contrast [[id number]] Collected: 07/11/23 1810     Updated: 07/11/23 1828    Narrative:      CT ANGIOGRAM CHEST PULMONARY EMBOLISM-, CT ABDOMEN PELVIS W CONTRAST-     Radiation dose reduction techniques were utilized, including automated  exposure control and exposure modulation based on body size.     Clinical: Chest pain, left flank and left upper quadrant pain     CT scan of the chest performed with intravenous contrast, pulmonary  embolus protocol to include coronal, sagittal and three-dimensional  reconstruction.     COMPARISON: None     FINDINGS: No aortic aneurysm nor dissection. The esophagus is normal. No  pulmonary embolus. Cardiac size within normal limits. No mediastinal or  hilar mass/lymphadenopathy.     Located on the left major fissure on image #78 is a 4 mm noncalcified  pulmonary nodule or node. The lungs are otherwise clear. No pulmonary  edema or pleural effusion seen. Tracheobronchial tree is satisfactory in  appearance.     The liver, gallbladder, pancreas and spleen are satisfactory in  appearance. No biliary duct dilatation. Both adrenal glands are normal.  The renal cortical pattern of enhancement is symmetric and satisfactory.  There is a 4 mm right renal cortical cysts, the kidneys are otherwise  unremarkable. No calculus or obstructive uropathy, the ureters are  normal in course  and caliber to the urinary bladder which is  satisfactory in appearance.     The appendix, colon and small bowel have a normal appearance. Caliber  within normal limits, no induration of the mesentery to suggest an  inflammatory process with special attention to the left abdomen. No free  intraperitoneal air nor free fluid seen. Stomach and duodenum have a  satisfactory appearance. Caliber the aorta is normal.     CONCLUSION:  1. No acute intrathoracic abnormality, specifically no pulmonary embolus  or aortic dissection.  2. 4 mm noncalcified pulmonary nodule or node, along the left major  fissure, one year follow-up CT chest advised.  3. No acute intra-abdominal abnormality.  4. Tiny right renal cortical cyst.        This report was finalized on 7/11/2023 6:25 PM by Dr. Juancho Seo M.D.                 Pertinent Labs     Results from last 7 days   Lab Units 07/14/23 0448 07/13/23 0509 07/12/23 0453 07/11/23  1625   WBC 10*3/mm3 7.74 10.07 14.11* 11.75*   HEMOGLOBIN g/dL 13.0 12.5* 13.9 15.3   PLATELETS 10*3/mm3 204 212 260 262     Results from last 7 days   Lab Units 07/14/23 0448 07/13/23  0509 07/12/23 0453 07/11/23  1625   SODIUM mmol/L 139 137 140 137   POTASSIUM mmol/L 3.8 4.1 4.4 4.3   CHLORIDE mmol/L 106 104 107 103   CO2 mmol/L 25.5 26.0 25.0 24.0   BUN mg/dL 11 11 12 12   CREATININE mg/dL 0.66* 0.80 0.82 0.77   GLUCOSE mg/dL 95 73 87 124*   EGFR mL/min/1.73 119.4 112.6 111.8 113.9     Results from last 7 days   Lab Units 07/12/23 0453 07/11/23  1625   ALBUMIN g/dL 4.2 4.6   BILIRUBIN mg/dL 1.7* 1.7*   ALK PHOS U/L 46 55   AST (SGOT) U/L 14 15   ALT (SGPT) U/L 13 18     Results from last 7 days   Lab Units 07/14/23 0448 07/13/23  0509 07/12/23 0453 07/11/23  1625   CALCIUM mg/dL 8.7 8.7 9.5 9.8   ALBUMIN g/dL  --   --  4.2 4.6   MAGNESIUM mg/dL  --   --   --  1.7     Results from last 7 days   Lab Units 07/11/23  1625   LIPASE U/L 32     Results from last 7 days   Lab Units 07/11/23  5670  07/11/23  1625   HSTROP T ng/L 6 <6           Invalid input(s): LDLCALC  Results from last 7 days   Lab Units 07/12/23  2308   BLOODCX  No growth at 24 hours  No growth at 24 hours         Test Results Pending at Discharge     Pending Labs       Order Current Status    Blood Culture - Blood, Arm, Left Preliminary result    Blood Culture - Blood, Hand, Right Preliminary result            Discharge Details        Discharge Medications        New Medications        Instructions Start Date   acetaminophen 325 MG tablet  Commonly known as: TYLENOL   650 mg, Oral, Every 4 Hours PRN      cyclobenzaprine 10 MG tablet  Commonly known as: FLEXERIL   10 mg, Oral, 3 Times Daily PRN      hyoscyamine 0.125 MG SL tablet  Commonly known as: LEVSIN   125 mcg, Sublingual, Every 4 Hours PRN             Continue These Medications        Instructions Start Date   meloxicam 15 MG tablet  Commonly known as: MOBIC   15 mg, Oral, Daily               No Known Allergies    Discharge Disposition:  Home or Self Care      Discharge Diet:  Diet Order   Procedures    Diet: Regular/House Diet; Texture: Regular Texture (IDDSI 7); Fluid Consistency: Thin (IDDSI 0)       Discharge Activity:       CODE STATUS:    Code Status and Medical Interventions:   Ordered at: 07/11/23 2153     Code Status (Patient has no pulse and is not breathing):    CPR (Attempt to Resuscitate)     Medical Interventions (Patient has pulse or is breathing):    Full Support       Future Appointments   Date Time Provider Department Center   7/24/2023  9:45 AM NURSE/MA [facility]   2/22/2024  9:00 AM [facility]   2/29/2024  1:15 PM Katt Lopez APRN [facility]      Follow-up Information       Katt Lopez APRN .    Specialty: Family Medicine  Contact information:  [address]  [place] [address]  [phone]               Dillon Vaughn MD Follow up.    Specialty: Urology  Contact  information:  [address] [place] [id number]  [phone]                             Time Spent on Discharge:  Greater than 55 minutes      JERRI Nelson  [facility]  07/14/23  09:25 EDT

## 2023-07-14 NOTE — DISCHARGE INSTRUCTIONS
Apply heat/ice to groin. Limit activity until pain improves. Follow up with PCP for persistent pain

## 2023-07-14 NOTE — CASE MANAGEMENT/SOCIAL WORK
Continued Stay Note  [facility]     Patient Name: Best Abdullahi  MRN: [id number]  Today's Date: 7/14/2023    Admit Date: 7/11/2023    Plan: home with family   Discharge Plan       Row Name 07/14/23 1107       Plan    Plan home with family    Patient/Family in Agreement with Plan yes    Plan Comments Plan remains home at [place]. CCP will follow as needed.                   Discharge Codes    No documentation.                 Expected Discharge Date and Time       Expected Discharge Date Expected Discharge Time    Jul 14, 2023               Lata James RN

## 2023-07-14 NOTE — CASE MANAGEMENT/SOCIAL WORK
Discharge Planning Assessment  [facility]     Patient Name: Best Abdullahi  MRN: [id number]  Today's Date: 7/14/2023    Admit Date: 7/11/2023    Plan: home with family   Discharge Needs Assessment    No documentation.                  Discharge Plan       Row Name 07/14/23 1750       Plan    Final Discharge Disposition Code 01 - home or self-care    Final Note dc'd home                  Continued Care and Services - Discharged on 7/14/2023 Admission date: 7/11/2023 - Discharge disposition: Home or Self Care   Coordination has not been started for this encounter.       Expected Discharge Date and Time       Expected Discharge Date Expected Discharge Time    Jul 14, 2023            Demographic Summary    No documentation.                  Functional Status    No documentation.                  Psychosocial    No documentation.                  Abuse/Neglect    No documentation.                  Legal    No documentation.                  Substance Abuse    No documentation.                  Patient Forms    No documentation.                     Lata James RN

## 2023-07-17 LAB
BACTERIA SPEC AEROBE CULT: NORMAL
BACTERIA SPEC AEROBE CULT: NORMAL

## 2023-07-21 ENCOUNTER — TELEPHONE (OUTPATIENT)
Dept: INTERNAL MEDICINE | Facility: CLINIC | Age: 43
End: 2023-07-21

## 2023-07-21 NOTE — TELEPHONE ENCOUNTER
Caller: Best Abdullahi    Relationship to patient: Self    Best call back number: [phone]    Patient is [facility] ADVISED THAT THEY CAN'T GET HIM IN TILL SEPTEMBER.  UNLESS HE DRIVES AND HOUR OUT OF HIS WAY TO [facility].  THEY ALSO SAID THEY DID NOT GET THE ORDER THAT WAS SENT OVER.  HE WOULD LIKE TO SEE IF BRITTANIE ESPAÑA COULD RESEND IT WITH URGENCY SO HE CAN GET IN SOONER.     PLEASE CALL WITH STATUS UPDATE TOO.

## 2023-07-24 ENCOUNTER — CLINICAL SUPPORT (OUTPATIENT)
Dept: INTERNAL MEDICINE | Facility: CLINIC | Age: 43
End: 2023-07-24
Payer: COMMERCIAL

## 2023-07-24 DIAGNOSIS — Z00.00 HEALTHCARE MAINTENANCE: Primary | ICD-10-CM

## 2023-07-24 PROCEDURE — 90471 IMMUNIZATION ADMIN: CPT | Performed by: NURSE PRACTITIONER

## 2023-07-24 PROCEDURE — 90744 HEPB VACC 3 DOSE PED/ADOL IM: CPT | Performed by: NURSE PRACTITIONER

## 2023-07-24 NOTE — PROGRESS NOTES
1/2 of dose/adolescent dose of the Hepatitis B vaccine was administered to the patient by mistake. Patient was immediately notified when this error was realized, and he returned to the office and received the other 1/2 of the dose needed. Safe report filed and corrective action log filled out.

## 2023-08-01 ENCOUNTER — OFFICE VISIT (OUTPATIENT)
Dept: SPORTS MEDICINE | Facility: CLINIC | Age: 43
End: 2023-08-01
Payer: COMMERCIAL

## 2023-08-01 VITALS
HEIGHT: 72 IN | OXYGEN SATURATION: 99 % | SYSTOLIC BLOOD PRESSURE: 120 MMHG | WEIGHT: 192 LBS | DIASTOLIC BLOOD PRESSURE: 70 MMHG | BODY MASS INDEX: 26.01 KG/M2 | HEART RATE: 100 BPM | RESPIRATION RATE: 16 BRPM

## 2023-08-01 DIAGNOSIS — S76.012D STRAIN OF HIP ADDUCTOR MUSCLE, LEFT, SUBSEQUENT ENCOUNTER: ICD-10-CM

## 2023-08-01 DIAGNOSIS — R10.32 LEFT GROIN PAIN: Primary | ICD-10-CM

## 2023-08-01 DIAGNOSIS — R26.2 DIFFICULTY WALKING: ICD-10-CM

## 2023-08-01 RX ORDER — LIDOCAINE 50 MG/G
1 PATCH TOPICAL EVERY 24 HOURS
Qty: 5 EACH | Refills: 0 | Status: SHIPPED | OUTPATIENT
Start: 2023-08-01

## 2023-08-08 DIAGNOSIS — S76.012D STRAIN OF HIP ADDUCTOR MUSCLE, LEFT, SUBSEQUENT ENCOUNTER: ICD-10-CM

## 2023-08-08 DIAGNOSIS — R10.32 LEFT GROIN PAIN: ICD-10-CM

## 2023-08-08 DIAGNOSIS — R26.2 DIFFICULTY WALKING: ICD-10-CM

## 2023-08-11 ENCOUNTER — TELEPHONE (OUTPATIENT)
Dept: SPORTS MEDICINE | Facility: CLINIC | Age: 43
End: 2023-08-11
Payer: COMMERCIAL

## 2023-08-11 NOTE — TELEPHONE ENCOUNTER
----- Message from Edvin Jeffries Jr., DO sent at 8/9/2023 11:53 AM EDT -----  Please contact to schedule office visit for MRI f/up.  ----- Message -----  From: Interface, Scans Incoming  Sent: 8/8/2023   3:55 AM EDT  To: Edvin Jeffries Jr.,

## 2023-08-11 NOTE — TELEPHONE ENCOUNTER
Called patient to schedule office visit for MRI follow up. Left message on voicemail requesting patient call back to schedule.

## 2023-08-14 ENCOUNTER — OFFICE VISIT (OUTPATIENT)
Dept: SPORTS MEDICINE | Facility: CLINIC | Age: 43
End: 2023-08-14
Payer: COMMERCIAL

## 2023-08-14 VITALS
OXYGEN SATURATION: 98 % | SYSTOLIC BLOOD PRESSURE: 120 MMHG | BODY MASS INDEX: 26.01 KG/M2 | HEART RATE: 94 BPM | DIASTOLIC BLOOD PRESSURE: 80 MMHG | RESPIRATION RATE: 16 BRPM | WEIGHT: 192 LBS | HEIGHT: 72 IN

## 2023-08-14 DIAGNOSIS — N43.3 HYDROCELE IN ADULT: ICD-10-CM

## 2023-08-14 DIAGNOSIS — R10.32 LEFT GROIN PAIN: Primary | ICD-10-CM

## 2023-08-14 DIAGNOSIS — R26.2 DIFFICULTY WALKING: ICD-10-CM

## 2023-08-14 DIAGNOSIS — S76.012D STRAIN OF HIP ADDUCTOR MUSCLE, LEFT, SUBSEQUENT ENCOUNTER: ICD-10-CM

## 2023-08-14 PROCEDURE — 99213 OFFICE O/P EST LOW 20 MIN: CPT | Performed by: FAMILY MEDICINE

## 2023-08-14 RX ORDER — AMANTADINE HYDROCHLORIDE 100 MG/1
100 TABLET ORAL 2 TIMES DAILY
COMMUNITY
Start: 2023-08-03

## 2023-08-14 RX ORDER — METHYLPREDNISOLONE 4 MG/1
TABLET ORAL
Qty: 21 TABLET | Refills: 0 | Status: SHIPPED | OUTPATIENT
Start: 2023-08-14

## 2023-08-14 NOTE — PROGRESS NOTES
"Best is a 43 y.o. year old male presents to [facility]    Chief Complaint   Patient presents with    Left Hip - Follow-up     F/u eval for LT hip and groin pain - sxs onset 07/11/2023 - here for MRI of the pelvis review, done on 08/05/2023 @ [facility]        History of Present Illness  Symptoms improving.  He is now able to ambulate without use of crutches around the house.  She has tried to improve his physical fitness level though still has limited with his groin pain.  He is able to begin to do abdominal workouts at this time.  Did find relief with Medrol Dosepak.  Is inquiring about seeing urology given the hydrocele history.    I have reviewed the patient's medical, family, and social history in detail and updated the computerized patient record.    /80 (BP Location: Right arm, Patient Position: Sitting, Cuff Size: Adult)   Pulse 94   Resp 16   Ht 182.9 cm (72.01\")   Wt 87.1 kg (192 lb)   SpO2 98%   BMI 26.03 kg/mý      Physical Exam    Vital signs reviewed.   General: No acute distress.  Eyes: conjunctiva clear; pupils equally round and reactive  ENT: external ears atraumatic  CV: no peripheral edema  Resp: normal respiratory effort, no use of accessory muscles  Skin: no rashes or wounds; normal turgor  Psych: mood and affect appropriate; recent and remote memory intact  Neuro: sensation to light touch intact    MSK Exam  L hip: Negative logroll.  Positive Rivas though this is improved.  Negative FADIR.  Positive Stinchfield.    MRI Pelvis Without Contrast (08/05/2023)   Reviewed independently.  No tears noted to the adductor tendon, common aponeurosis.  No stress injuries noted.             Diagnoses and all orders for this visit:    Left groin pain    Strain of hip adductor muscle, left, subsequent encounter  -     methylPREDNISolone (MEDROL) 4 MG dose pack; Take as directed on package instructions.    Difficulty walking    Hydrocele in adult  -    "  Ambulatory Referral to Urology    Other orders  -     amantadine (SYMMETREL) 100 MG tablet; Take 1 tablet by mouth 2 (Two) Times a Day.      Has had good response to Dosepak and is clinically improving.  I am sending another Dosepak to see if this will continue to help with his musculoskeletal symptoms.  His MRI is reassuring.  I do recommend to advance activity as tolerated.  I am also referring back to urology given his ongoing urological complaints.      Follow Up   Return if symptoms worsen or fail to improve.  Patient was given instructions and counseling regarding his condition or for health maintenance advice. Please see specific information pulled into the AVS if appropriate.     EMR Dragon/Transcription disclaimer:    Much of this encounter note is an electronic transcription/translation of spoken language to printed text.  The electronic translation of spoken language may permit erroneous, or at times, nonsensical words or phrases to be inadvertently transcribed.  Although I have reviewed the note for such errors some may still exist.

## 2024-02-21 DIAGNOSIS — R73.03 PREDIABETES: ICD-10-CM

## 2024-02-21 DIAGNOSIS — E55.9 VITAMIN D DEFICIENCY: ICD-10-CM

## 2024-02-21 DIAGNOSIS — Z00.00 HEALTH CARE MAINTENANCE: Primary | ICD-10-CM

## 2024-02-29 ENCOUNTER — OFFICE VISIT (OUTPATIENT)
Dept: INTERNAL MEDICINE | Facility: CLINIC | Age: 44
End: 2024-02-29
Payer: COMMERCIAL

## 2024-02-29 VITALS
HEART RATE: 99 BPM | BODY MASS INDEX: 26.14 KG/M2 | HEIGHT: 72 IN | WEIGHT: 193 LBS | OXYGEN SATURATION: 98 % | SYSTOLIC BLOOD PRESSURE: 132 MMHG | DIASTOLIC BLOOD PRESSURE: 88 MMHG

## 2024-02-29 DIAGNOSIS — R73.03 PREDIABETES: ICD-10-CM

## 2024-02-29 DIAGNOSIS — R03.0 ELEVATED BLOOD PRESSURE READING: ICD-10-CM

## 2024-02-29 DIAGNOSIS — Z00.00 HEALTH CARE MAINTENANCE: Primary | ICD-10-CM

## 2024-02-29 PROCEDURE — 99396 PREV VISIT EST AGE 40-64: CPT | Performed by: NURSE PRACTITIONER

## 2024-02-29 NOTE — PATIENT INSTRUCTIONS
Elevated Blood pressure- typically well controlled, watch Na diet, check at home, notify for BP >130/80

## 2024-02-29 NOTE — PROGRESS NOTES
Subjective   Best Abdullahi is a 44 y.o. male.     History of Present Illness   The patient is here today for CPE and lab work F/U. He is feeling well other than some jaw clenching.     BMI is >= 25 and <30. (Overweight) The following options were offered after discussion;: exercise counseling/recommendations and nutrition counseling/recommendations   Kids are 10 and 6.   The following portions of the patient's history were reviewed and updated as appropriate: allergies, current medications, past family history, past medical history, past social history, past surgical history and problem list.    Review of Systems   Constitutional:  Negative for chills and fever.   HENT:  Negative for ear pain, rhinorrhea and sore throat.    Eyes: Negative.    Respiratory:  Negative for cough and shortness of breath.    Cardiovascular: Negative.    Gastrointestinal: Negative.    Endocrine: Negative for cold intolerance and heat intolerance.   Genitourinary:  Negative for decreased urine volume, difficulty urinating, discharge, dysuria, flank pain, frequency, genital sores, hematuria, penile pain, erectile dysfunction, testicular pain and urgency.   Musculoskeletal:  Positive for arthralgias (occ shoulder pain, self relieved with massage gun) and back pain.   Skin: Negative.    Allergic/Immunologic: Negative for environmental allergies and food allergies.   Neurological: Negative.    Hematological: Negative.    Psychiatric/Behavioral:  Negative for dysphoric mood and suicidal ideas. The patient is not nervous/anxious.        Objective   Physical Exam  Constitutional:       Appearance: Normal appearance. He is well-developed.   HENT:      Right Ear: Hearing, tympanic membrane, ear canal and external ear normal.      Left Ear: Hearing, tympanic membrane, ear canal and external ear normal.   Eyes:      General: Lids are normal.      Conjunctiva/sclera: Conjunctivae normal.      Pupils: Pupils are equal, round, and reactive to  light.   Neck:      Thyroid: No thyromegaly.      Vascular: No carotid bruit.      Trachea: Trachea normal.   Cardiovascular:      Rate and Rhythm: Normal rate and regular rhythm.      Heart sounds: Normal heart sounds.   Pulmonary:      Effort: Pulmonary effort is normal.      Breath sounds: Normal breath sounds.   Abdominal:      General: Bowel sounds are normal.      Palpations: Abdomen is soft.      Tenderness: There is no abdominal tenderness.   Musculoskeletal:         General: Normal range of motion.      Cervical back: Normal range of motion and neck supple.   Lymphadenopathy:      Cervical: No cervical adenopathy.      Upper Body:      Right upper body: No supraclavicular adenopathy.      Left upper body: No supraclavicular adenopathy.   Skin:     General: Skin is warm and dry.   Neurological:      Mental Status: He is alert and oriented to person, place, and time.      Cranial Nerves: No cranial nerve deficit.      Sensory: No sensory deficit.      Deep Tendon Reflexes:      Reflex Scores:       Patellar reflexes are 2+ on the right side and 2+ on the left side.  Psychiatric:         Speech: Speech normal.         Behavior: Behavior normal.         Thought Content: Thought content normal.         Judgment: Judgment normal.         Vitals:    02/29/24 1321   BP: 132/88   Pulse: 99   SpO2: 98%     Body mass index is 26.17 kg/m².      Assessment & Plan   Diagnoses and all orders for this visit:    1. Health care maintenance (Primary)    2. Prediabetes    3. Elevated blood pressure reading                 1. Preventative counseling- continue to eat healthy and exercise   Quit vaping/discussed cessation of marijuana.   2. Prediabetes- check lab   3. Elevated Blood pressure- typically well controlled, watch Na diet, check at home, notify for BP >130/80    Discussed flu vaccine

## 2024-03-01 LAB
25(OH)D3+25(OH)D2 SERPL-MCNC: 19.2 NG/ML (ref 30–100)
ALBUMIN SERPL-MCNC: 4.6 G/DL (ref 3.5–5.2)
ALBUMIN/GLOB SERPL: 1.8 G/DL
ALP SERPL-CCNC: 57 U/L (ref 39–117)
ALT SERPL-CCNC: 20 U/L (ref 1–41)
AST SERPL-CCNC: 17 U/L (ref 1–40)
BASOPHILS # BLD AUTO: 0.05 10*3/MM3 (ref 0–0.2)
BASOPHILS NFR BLD AUTO: 0.8 % (ref 0–1.5)
BILIRUB SERPL-MCNC: 1.1 MG/DL (ref 0–1.2)
BUN SERPL-MCNC: 11 MG/DL (ref 6–20)
BUN/CREAT SERPL: 12.5 (ref 7–25)
CALCIUM SERPL-MCNC: 9.4 MG/DL (ref 8.6–10.5)
CHLORIDE SERPL-SCNC: 102 MMOL/L (ref 98–107)
CHOLEST SERPL-MCNC: 180 MG/DL (ref 0–200)
CHOLEST/HDLC SERPL: 3.91 {RATIO}
CO2 SERPL-SCNC: 26.6 MMOL/L (ref 22–29)
CREAT SERPL-MCNC: 0.88 MG/DL (ref 0.76–1.27)
EGFRCR SERPLBLD CKD-EPI 2021: 108.7 ML/MIN/1.73
EOSINOPHIL # BLD AUTO: 0.02 10*3/MM3 (ref 0–0.4)
EOSINOPHIL NFR BLD AUTO: 0.3 % (ref 0.3–6.2)
ERYTHROCYTE [DISTWIDTH] IN BLOOD BY AUTOMATED COUNT: 12.6 % (ref 12.3–15.4)
GLOBULIN SER CALC-MCNC: 2.5 GM/DL
GLUCOSE SERPL-MCNC: 103 MG/DL (ref 65–99)
HBA1C MFR BLD: 5.9 % (ref 4.8–5.6)
HCT VFR BLD AUTO: 44.7 % (ref 37.5–51)
HDLC SERPL-MCNC: 46 MG/DL (ref 40–60)
HGB BLD-MCNC: 15 G/DL (ref 13–17.7)
IMM GRANULOCYTES # BLD AUTO: 0.04 10*3/MM3 (ref 0–0.05)
IMM GRANULOCYTES NFR BLD AUTO: 0.6 % (ref 0–0.5)
LDLC SERPL CALC-MCNC: 115 MG/DL (ref 0–100)
LYMPHOCYTES # BLD AUTO: 1.7 10*3/MM3 (ref 0.7–3.1)
LYMPHOCYTES NFR BLD AUTO: 25.6 % (ref 19.6–45.3)
MCH RBC QN AUTO: 29.1 PG (ref 26.6–33)
MCHC RBC AUTO-ENTMCNC: 33.6 G/DL (ref 31.5–35.7)
MCV RBC AUTO: 86.8 FL (ref 79–97)
MONOCYTES # BLD AUTO: 0.54 10*3/MM3 (ref 0.1–0.9)
MONOCYTES NFR BLD AUTO: 8.1 % (ref 5–12)
NEUTROPHILS # BLD AUTO: 4.29 10*3/MM3 (ref 1.7–7)
NEUTROPHILS NFR BLD AUTO: 64.6 % (ref 42.7–76)
NRBC BLD AUTO-RTO: 0 /100 WBC (ref 0–0.2)
PLATELET # BLD AUTO: 261 10*3/MM3 (ref 140–450)
POTASSIUM SERPL-SCNC: 4.6 MMOL/L (ref 3.5–5.2)
PROT SERPL-MCNC: 7.1 G/DL (ref 6–8.5)
RBC # BLD AUTO: 5.15 10*6/MM3 (ref 4.14–5.8)
SODIUM SERPL-SCNC: 140 MMOL/L (ref 136–145)
TRIGL SERPL-MCNC: 107 MG/DL (ref 0–150)
TSH SERPL DL<=0.005 MIU/L-ACNC: 1.12 UIU/ML (ref 0.27–4.2)
VLDLC SERPL CALC-MCNC: 19 MG/DL (ref 5–40)
WBC # BLD AUTO: 6.64 10*3/MM3 (ref 3.4–10.8)

## 2024-03-05 RX ORDER — ERGOCALCIFEROL 1.25 MG/1
50000 CAPSULE ORAL WEEKLY
Qty: 8 CAPSULE | Refills: 0 | Status: SHIPPED | OUTPATIENT
Start: 2024-03-05

## 2025-03-03 ENCOUNTER — OFFICE VISIT (OUTPATIENT)
Dept: INTERNAL MEDICINE | Facility: CLINIC | Age: 45
End: 2025-03-03
Payer: COMMERCIAL

## 2025-03-03 VITALS
WEIGHT: 200 LBS | HEIGHT: 72 IN | HEART RATE: 92 BPM | DIASTOLIC BLOOD PRESSURE: 82 MMHG | BODY MASS INDEX: 27.09 KG/M2 | OXYGEN SATURATION: 99 % | SYSTOLIC BLOOD PRESSURE: 128 MMHG

## 2025-03-03 DIAGNOSIS — R73.03 PREDIABETES: ICD-10-CM

## 2025-03-03 DIAGNOSIS — Z12.11 COLON CANCER SCREENING: ICD-10-CM

## 2025-03-03 DIAGNOSIS — N50.89 TESTICULAR MASS: ICD-10-CM

## 2025-03-03 DIAGNOSIS — E55.9 VITAMIN D DEFICIENCY: ICD-10-CM

## 2025-03-03 DIAGNOSIS — N50.89 TESTICULAR SWELLING: ICD-10-CM

## 2025-03-03 DIAGNOSIS — Z00.00 HEALTH CARE MAINTENANCE: Primary | ICD-10-CM

## 2025-03-03 PROCEDURE — 99396 PREV VISIT EST AGE 40-64: CPT | Performed by: NURSE PRACTITIONER

## 2025-03-03 NOTE — PROGRESS NOTES
Fabricio Abdullahi is a 45 y.o. male.     History of Present Illness   The patient is here today for CPE and lab work F/U.  Everyone was sick last week with the flu.     Still looking for a job.     Scrotal nodule noted about a week ago Rt side, may have resolved.     BMI is >= 25 and <30. (Overweight) The following options were offered after discussion;: exercise counseling/recommendations and nutrition counseling/recommendations     Kids are 11 and 7.   The following portions of the patient's history were reviewed and updated as appropriate: allergies, current medications, past family history, past medical history, past social history, past surgical history and problem list.    Review of Systems   Constitutional:  Negative for chills, fatigue and fever.   HENT:  Negative for ear pain, rhinorrhea and sore throat.    Eyes: Negative.    Respiratory:  Negative for cough and shortness of breath.    Cardiovascular: Negative.    Gastrointestinal: Negative.    Endocrine: Negative for cold intolerance and heat intolerance.   Genitourinary:  Positive for scrotal swelling. Negative for decreased urine volume, difficulty urinating, discharge, dysuria, flank pain, frequency, genital sores, hematuria, penile pain, erectile dysfunction, testicular pain and urgency.   Musculoskeletal: Negative.    Skin: Negative.    Allergic/Immunologic: Negative for environmental allergies and food allergies.   Neurological: Negative.    Hematological: Negative.    Psychiatric/Behavioral:  Positive for stress. Negative for dysphoric mood and suicidal ideas. The patient is not nervous/anxious.        Objective   Physical Exam  Constitutional:       Appearance: Normal appearance. He is well-developed.   HENT:      Right Ear: Hearing, tympanic membrane, ear canal and external ear normal.      Left Ear: Hearing, tympanic membrane, ear canal and external ear normal.      Nose: Nose normal.      Mouth/Throat:      Pharynx: Uvula midline.    Eyes:      General: Lids are normal.      Conjunctiva/sclera: Conjunctivae normal.      Pupils: Pupils are equal, round, and reactive to light.   Neck:      Thyroid: No thyromegaly.      Vascular: No carotid bruit.      Trachea: Trachea normal.   Cardiovascular:      Rate and Rhythm: Normal rate and regular rhythm.      Heart sounds: Normal heart sounds.   Pulmonary:      Effort: Pulmonary effort is normal.      Breath sounds: Normal breath sounds.   Abdominal:      General: Bowel sounds are normal.      Palpations: Abdomen is soft.      Tenderness: There is no abdominal tenderness.   Genitourinary:     Testes:         Right: Mass (soft tissue mass located right testicle anteriorly/superior, non tender) present. Tenderness, swelling, testicular hydrocele or varicocele not present. Right testis is descended. Cremasteric reflex is present.          Left: Mass, tenderness, swelling, testicular hydrocele or varicocele not present. Left testis is descended. Cremasteric reflex is present.       Epididymis:      Right: Normal.      Left: Normal.   Musculoskeletal:         General: Normal range of motion.      Cervical back: Normal range of motion and neck supple.   Lymphadenopathy:      Cervical: No cervical adenopathy.      Upper Body:      Right upper body: No supraclavicular adenopathy.      Left upper body: No supraclavicular adenopathy.   Skin:     General: Skin is warm and dry.   Neurological:      Mental Status: He is alert and oriented to person, place, and time.      Cranial Nerves: No cranial nerve deficit.      Sensory: No sensory deficit.      Deep Tendon Reflexes:      Reflex Scores:       Patellar reflexes are 2+ on the right side and 2+ on the left side.  Psychiatric:         Speech: Speech normal.         Behavior: Behavior normal.         Thought Content: Thought content normal.         Judgment: Judgment normal.         Vitals:    03/03/25 1323   BP: 128/82   Pulse: 92   SpO2: 99%     Body mass index is  27.12 kg/m².      Assessment & Plan   Diagnoses and all orders for this visit:    1. Health care maintenance (Primary)  -     Comprehensive metabolic panel  -     Hemoglobin A1c  -     CBC & Differential  -     Vitamin D 25 hydroxy  -     Lipid Panel w/ Chol/HDL Ratio  -     TSH Rfx On Abnormal To Free T4    2. Prediabetes  -     Comprehensive metabolic panel  -     Hemoglobin A1c  -     CBC & Differential  -     Vitamin D 25 hydroxy  -     Lipid Panel w/ Chol/HDL Ratio  -     TSH Rfx On Abnormal To Free T4    3. Vitamin D deficiency  -     Comprehensive metabolic panel  -     Hemoglobin A1c  -     CBC & Differential  -     Vitamin D 25 hydroxy  -     Lipid Panel w/ Chol/HDL Ratio  -     TSH Rfx On Abnormal To Free T4    4. Testicular swelling    5. Testicular mass    6. Colon cancer screening  -     Ambulatory Referral For Screening Colonoscopy                 1. Preventive counseling- continue to eat healthy and exercise   2. Scrotal swelling -see urology  3. Testicular mass- see urology, notify if eduardo is scheduled out and will order US  4. Vit D def- taking Vit D sporadically- 2000 IU

## 2025-03-04 LAB
25(OH)D3+25(OH)D2 SERPL-MCNC: 18.1 NG/ML (ref 30–100)
ALBUMIN SERPL-MCNC: 4.2 G/DL (ref 3.5–5.2)
ALBUMIN/GLOB SERPL: 1.6 G/DL
ALP SERPL-CCNC: 54 U/L (ref 39–117)
ALT SERPL-CCNC: 23 U/L (ref 1–41)
AST SERPL-CCNC: 17 U/L (ref 1–40)
BASOPHILS # BLD AUTO: 0.04 10*3/MM3 (ref 0–0.2)
BASOPHILS NFR BLD AUTO: 0.4 % (ref 0–1.5)
BILIRUB SERPL-MCNC: 1.1 MG/DL (ref 0–1.2)
BUN SERPL-MCNC: 9 MG/DL (ref 6–20)
BUN/CREAT SERPL: 10.7 (ref 7–25)
CALCIUM SERPL-MCNC: 9.4 MG/DL (ref 8.6–10.5)
CHLORIDE SERPL-SCNC: 103 MMOL/L (ref 98–107)
CHOLEST SERPL-MCNC: 177 MG/DL (ref 0–200)
CHOLEST/HDLC SERPL: 4.54 {RATIO}
CO2 SERPL-SCNC: 27.1 MMOL/L (ref 22–29)
CREAT SERPL-MCNC: 0.84 MG/DL (ref 0.76–1.27)
EGFRCR SERPLBLD CKD-EPI 2021: 109.6 ML/MIN/1.73
EOSINOPHIL # BLD AUTO: 0.03 10*3/MM3 (ref 0–0.4)
EOSINOPHIL NFR BLD AUTO: 0.3 % (ref 0.3–6.2)
ERYTHROCYTE [DISTWIDTH] IN BLOOD BY AUTOMATED COUNT: 12.6 % (ref 12.3–15.4)
GLOBULIN SER CALC-MCNC: 2.6 GM/DL
GLUCOSE SERPL-MCNC: 123 MG/DL (ref 65–99)
HBA1C MFR BLD: 5.7 % (ref 4.8–5.6)
HCT VFR BLD AUTO: 45.5 % (ref 37.5–51)
HDLC SERPL-MCNC: 39 MG/DL (ref 40–60)
HGB BLD-MCNC: 15.7 G/DL (ref 13–17.7)
IMM GRANULOCYTES # BLD AUTO: 0.17 10*3/MM3 (ref 0–0.05)
IMM GRANULOCYTES NFR BLD AUTO: 1.9 % (ref 0–0.5)
LDLC SERPL CALC-MCNC: 117 MG/DL (ref 0–100)
LYMPHOCYTES # BLD AUTO: 2.11 10*3/MM3 (ref 0.7–3.1)
LYMPHOCYTES NFR BLD AUTO: 23 % (ref 19.6–45.3)
MCH RBC QN AUTO: 29.7 PG (ref 26.6–33)
MCHC RBC AUTO-ENTMCNC: 34.5 G/DL (ref 31.5–35.7)
MCV RBC AUTO: 86.2 FL (ref 79–97)
MONOCYTES # BLD AUTO: 0.73 10*3/MM3 (ref 0.1–0.9)
MONOCYTES NFR BLD AUTO: 8 % (ref 5–12)
NEUTROPHILS # BLD AUTO: 6.09 10*3/MM3 (ref 1.7–7)
NEUTROPHILS NFR BLD AUTO: 66.4 % (ref 42.7–76)
NRBC BLD AUTO-RTO: 0 /100 WBC (ref 0–0.2)
PLATELET # BLD AUTO: 310 10*3/MM3 (ref 140–450)
POTASSIUM SERPL-SCNC: 4.7 MMOL/L (ref 3.5–5.2)
PROT SERPL-MCNC: 6.8 G/DL (ref 6–8.5)
RBC # BLD AUTO: 5.28 10*6/MM3 (ref 4.14–5.8)
SODIUM SERPL-SCNC: 139 MMOL/L (ref 136–145)
TRIGL SERPL-MCNC: 113 MG/DL (ref 0–150)
TSH SERPL DL<=0.005 MIU/L-ACNC: 1.05 UIU/ML (ref 0.27–4.2)
VLDLC SERPL CALC-MCNC: 21 MG/DL (ref 5–40)
WBC # BLD AUTO: 9.17 10*3/MM3 (ref 3.4–10.8)

## 2025-03-05 DIAGNOSIS — E55.9 VITAMIN D DEFICIENCY: Primary | ICD-10-CM

## 2025-04-22 RX ORDER — ERGOCALCIFEROL 1.25 MG/1
50000 CAPSULE, LIQUID FILLED ORAL WEEKLY
Qty: 4 CAPSULE | Refills: 1 | Status: SHIPPED | OUTPATIENT
Start: 2025-04-22